# Patient Record
Sex: FEMALE | NOT HISPANIC OR LATINO | Employment: PART TIME | URBAN - METROPOLITAN AREA
[De-identification: names, ages, dates, MRNs, and addresses within clinical notes are randomized per-mention and may not be internally consistent; named-entity substitution may affect disease eponyms.]

---

## 2017-06-08 ENCOUNTER — OFFICE VISIT (OUTPATIENT)
Dept: INTERNAL MEDICINE | Facility: CLINIC | Age: 27
End: 2017-06-08
Payer: COMMERCIAL

## 2017-06-08 VITALS
WEIGHT: 118.9 LBS | DIASTOLIC BLOOD PRESSURE: 62 MMHG | OXYGEN SATURATION: 100 % | TEMPERATURE: 97.9 F | BODY MASS INDEX: 19.81 KG/M2 | SYSTOLIC BLOOD PRESSURE: 94 MMHG | HEART RATE: 68 BPM | HEIGHT: 65 IN

## 2017-06-08 DIAGNOSIS — G57.92 COMPRESSION NEUROPATHY OF LEFT LOWER EXTREMITY: Primary | ICD-10-CM

## 2017-06-08 PROCEDURE — 99213 OFFICE O/P EST LOW 20 MIN: CPT | Performed by: INTERNAL MEDICINE

## 2017-06-08 NOTE — PROGRESS NOTES
"  SUBJECTIVE:                                                    Matthias Quarlse is a 26 year old female who presents to clinic today for the following health issues:    Patient complains of tingling in left lower leg it started on Sunday and her dad had her spray some magnesium spray on it and that make it feel like it started tingling in areas that it had not before.  She sits typically with legs crossed, mostly right leg over left. Also kneels and crosses her legs a lot when sitting.    Problem list and histories reviewed & adjusted, as indicated.  Additional history: as documentedas documented    Labs reviewed in EPIC    Reviewed and updated as needed this visit by clinical staff  Allergies       Reviewed and updated as needed this visit by Provider         ROS:  Constitutional, HEENT, cardiovascular, pulmonary, gi and gu systems are negative, except as otherwise noted.    OBJECTIVE:                                                    BP 94/62  Pulse 68  Temp 97.9  F (36.6  C) (Oral)  Ht 5' 5\" (1.651 m)  Wt 118 lb 14.4 oz (53.9 kg)  LMP 05/15/2017  SpO2 100%  BMI 19.79 kg/m2  Body mass index is 19.79 kg/(m^2).  GENERAL APPEARANCE: healthy, alert and no distress  MS: extremities normal- no gross deformities noted  SKIN: no suspicious lesions or rashes  NEURO: Normal strength and tone, mentation intact and speech normal  Sensation - notes some numbness along anterior aspect of L lower leg along tibia. None in foot  Full strength on foot plantar and dorsiflexion.    Diagnostic test results:  none      ASSESSMENT/PLAN:                                                    1. Compression neuropathy of left lower extremity  -locall nerve compression such as mild peroneal neuropathy  -avoid crossing legs, kneeling, daily-cross      Follow up with Provider - joan Hurst MD  [facility]    "

## 2017-06-08 NOTE — NURSING NOTE
"Chief Complaint   Patient presents with     Musculoskeletal Problem       Initial BP 94/62  Pulse 68  Temp 97.9  F (36.6  C) (Oral)  Ht 5' 5\" (1.651 m)  Wt 118 lb 14.4 oz (53.9 kg)  LMP 05/15/2017  SpO2 100%  BMI 19.79 kg/m2 Estimated body mass index is 19.79 kg/(m^2) as calculated from the following:    Height as of this encounter: 5' 5\" (1.651 m).    Weight as of this encounter: 118 lb 14.4 oz (53.9 kg).  Medication Reconciliation: complete    "

## 2017-06-08 NOTE — MR AVS SNAPSHOT
"              After Visit Summary   6/8/2017    Matthias Quarles    MRN: [id number]           Patient Information     Date Of Birth          1990        Visit Information        Provider Department      6/8/2017 10:20 AM Paul Hurst MD [facility]        Today's Diagnoses     Compression neuropathy of left lower extremity    -  1       Follow-ups after your visit        Who to contact     If you have questions or need follow up information about today's clinic visit or your schedule please contact [facility] directly at [phone].  Normal or non-critical lab and imaging results will be communicated to you by eMithilaHaathart, letter or phone within 4 business days after the clinic has received the results. If you do not hear from us within 7 days, please contact the clinic through eMithilaHaathart or phone. If you have a critical or abnormal lab result, we will notify you by phone as soon as possible.  Submit refill requests through Yadio or call your pharmacy and they will forward the refill request to us. Please allow 3 business days for your refill to be completed.          Additional Information About Your Visit        MyChart Information     Yadio gives you secure access to your electronic health record. If you see a primary care provider, you can also send messages to your care team and make appointments. If you have questions, please call your primary care clinic.  If you do not have a primary care provider, please call [phone] and they will assist you.        Care EveryWhere ID     This is your Care EveryWhere ID. This could be used by other organizations to access your [place] medical records  [id number]        Your Vitals Were     Pulse Temperature Height Last Period Pulse Oximetry BMI (Body Mass Index)    68 97.9  F (36.6  C) (Oral) 5' 5\" (1.651 m) 05/15/2017 100% 19.79 kg/m2       Blood Pressure from Last 3 Encounters:   06/08/17 94/62   12/01/16 " 90/58   11/30/16 110/78    Weight from Last 3 Encounters:   06/08/17 118 lb 14.4 oz (53.9 kg)   12/01/16 117 lb 6.4 oz (53.3 kg)   11/30/16 116 lb 8 oz (52.8 kg)              Today, you had the following     No orders found for display       Primary Care Provider Office Phone # Fax #    Sagar Schroeder [phone]       XXX RETIRED  [address] [place] [id number]        Thank you!     Thank you for choosing [facility]  for your care. Our goal is always to provide you with excellent care. Hearing back from our patients is one way we can continue to improve our services. Please take a few minutes to complete the written survey that you may receive in the mail after your visit with us. Thank you!             Your Updated Medication List - Protect others around you: Learn how to safely use, store and throw away your medicines at www.disposemymeds.org.          This list is accurate as of: 6/8/17 10:54 AM.  Always use your most recent med list.                   Brand Name Dispense Instructions for use    norgestim-eth estrad triphasic 0.18/0.215/0.25 MG-35 MCG per tablet    TRINESSA (28)    84 tablet    Take 1 tablet by mouth daily

## 2017-06-20 ENCOUNTER — OFFICE VISIT (OUTPATIENT)
Dept: ORTHOPEDICS | Facility: CLINIC | Age: 27
End: 2017-06-20
Payer: COMMERCIAL

## 2017-06-20 VITALS
WEIGHT: 120 LBS | HEIGHT: 65 IN | DIASTOLIC BLOOD PRESSURE: 80 MMHG | SYSTOLIC BLOOD PRESSURE: 122 MMHG | BODY MASS INDEX: 19.99 KG/M2

## 2017-06-20 DIAGNOSIS — M22.2X1 PATELLOFEMORAL STRESS SYNDROME OF BOTH KNEES: Primary | ICD-10-CM

## 2017-06-20 DIAGNOSIS — M22.2X2 PATELLOFEMORAL STRESS SYNDROME OF BOTH KNEES: Primary | ICD-10-CM

## 2017-06-20 PROCEDURE — 99203 OFFICE O/P NEW LOW 30 MIN: CPT | Performed by: FAMILY MEDICINE

## 2017-06-20 NOTE — PROGRESS NOTES
"ASSESSMENT & PLAN    ICD-10-CM    1. Patellofemoral stress syndrome of both knees M22.2X1 [facility], HAND, AND CHIROPRACTIC REFERRAL    M22.2X2    Activity modification as discussed  Physical therapy: [facility] - [phone]  If you come to [place] for physical therapy would recommend [facility], Lang, Paul Breyen or [facility].    Follow up after 4-6 PT sessions if not improving or sooner if needed. Call direct clinic number [[phone]] at any time with questions or concerns.. Instructed to call the office if the condition evolves or worsens.    -----    SUBJECTIVE  Matthias Willam is a/an 26 year old female who is seen as self referral for evaluation of bilateral knee pain. The patient is seen with their father.    Onset: 10+ years(s) ago. Reports insidious onset without acute precipitating event. She feels like it started when she had a sudden growth spurt.   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    REVIEW OF SYSTEMS:  10 point ROS is negative other than symptoms noted above in HPI, Past Medical History or as stated below  Constitutional: NEGATIVE for fever, chills, change in weight  Skin: NEGATIVE for worrisome rashes, moles or lesions  GI/: NEGATIVE for bowel or bladder changes  Neuro: NEGATIVE for weakness, dizziness or paresthesias    OBJECTIVE:  /80  Ht 5' 5\" (1.651 m)  Wt 120 lb (54.4 kg)  LMP 05/15/2017  BMI 19.97 kg/m2   General: healthy, alert and in no distress  HEENT: no scleral icterus or conjunctival erythema  Skin: no suspicious lesions or " rash. No jaundice.  CV: no pedal edema  Resp: normal respiratory effort without conversational dyspnea   Psych: normal mood and affect  Gait: normal steady gait with appropriate coordination and balance  Neuro: Motor strength as noted below  MSK:  BILATERAL KNEE  Inspection:    normal alignment, increased knee valgus with one-legged squat  Palpation:    Tender about the lateral patellar facet and medial patellar facet. Remainder of bony and ligamentous landmarks are nontender.    No effusion is present    Patellofemoral crepitus is Present, excessive mobility (medial > lateral)  Range of Motion:     00 extension to 1350 flexion  Strength:    Quadriceps 5-/5    Extensor mechanism intact  Special Tests:    Positive: Patellar grind    Negative: Lachman's, posterior drawer, Valeria's    Independent visualization of the below image:  None indicated at this time    Patient's conditions were thoroughly discussed during today's visit with greater than 50% of the visit spent counseling the patient with total time spent face-to-face with the patient being 20 minutes.    Grupo Kimble DO [facility]

## 2017-06-20 NOTE — PATIENT INSTRUCTIONS
Thank you for allowing us to participate in your care today.  Please find below your visit diagnosis and the plan going forward.    1. Patellofemoral stress syndrome of both knees      Activity modification as discussed  Physical therapy: [facility] - [phone]  If you come to [place] for physical therapy would recommend [facility], [facility], [facility] or [facility].    Follow up after 4-6 PT sessions if not improving or sooner if needed. Call direct clinic number [[phone]] at any time with questions or concerns.    Grupo Kimble DO CAQSM  [facility]  Website: [email or web address]  Twitter: [email or web address]

## 2017-06-20 NOTE — MR AVS SNAPSHOT
After Visit Summary   6/20/2017    Matthias Quarles    MRN: [id number]           Patient Information     Date Of Birth          1990        Visit Information        Provider Department      6/20/2017 5:40 PM Grupo Kimble DO [facility]        Today's Diagnoses     Patellofemoral stress syndrome of both knees    -  1      Care Instructions    Thank you for allowing us to participate in your care today.  Please find below your visit diagnosis and the plan going forward.    1. Patellofemoral stress syndrome of both knees      Activity modification as discussed  Physical therapy: [facility] - [phone]  If you come to [place] for physical therapy would recommend [facility], [facility], [facility] Breyen or [facility].    Follow up after 4-6 PT sessions if not improving or sooner if needed. Call direct clinic number [[phone]] at any time with questions or concerns.    Grupo Kimble DO [facility] includes the [facility]  Website: [email or web address]  Twitter: [email or web address]            Follow-ups after your visit        Additional Services     [facility] PT, HAND, AND CHIROPRACTIC REFERRAL       **This order will print in the [facility] Scheduling Office**    Physical Therapy, Hand Therapy and Chiropractic Care are available through:    *[facility]  *[facility]  *[facility]    Call one number to schedule at any of the above locations: [phone].    Your provider has referred you to: Physical Therapy at [facility] or [facility]    Indication/Reason for Referral: b/l PFPS  Onset of Illness: chronic  Therapy Orders: Evaluate and Treat  Special Programs: None  Special Request: None    Jorge Patel      Additional Comments for the Therapist or Chiropractor: Formal physical therapy - exercises to include neuromuscular/proprioceptive control and VMO strengthening. Please also include pain-free closed  "chain/isometric/isotonic strengthening with use of modalities (including kinesioptaping) as needed/deemed appropriate with home exercise prescription.    Please be aware that coverage of these services is subject to the terms and limitations of your health insurance plan.  Call member services at your health plan with any benefit or coverage questions.      Please bring the following to your appointment:    *Your personal calendar for scheduling future appointments  *Comfortable clothing                  Who to contact     If you have questions or need follow up information about today's clinic visit or your schedule please contact [facility] directly at [phone].  Normal or non-critical lab and imaging results will be communicated to you by Domain Investhart, letter or phone within 4 business days after the clinic has received the results. If you do not hear from us within 7 days, please contact the clinic through BDAt or phone. If you have a critical or abnormal lab result, we will notify you by phone as soon as possible.  Submit refill requests through ROIÂ² or call your pharmacy and they will forward the refill request to us. Please allow 3 business days for your refill to be completed.          Additional Information About Your Visit        Domain Investhart Information     ROIÂ² gives you secure access to your electronic health record. If you see a primary care provider, you can also send messages to your care team and make appointments. If you have questions, please call your primary care clinic.  If you do not have a primary care provider, please call [phone] and they will assist you.        Care EveryWhere ID     This is your Care EveryWhere ID. This could be used by other organizations to access your [place] medical records  [id number]        Your Vitals Were     Height Last Period BMI (Body Mass Index)             5' 5\" (1.651 m) 05/15/2017 19.97 kg/m2          Blood Pressure from Last " 3 Encounters:   06/20/17 122/80   06/08/17 94/62   12/01/16 90/58    Weight from Last 3 Encounters:   06/20/17 120 lb (54.4 kg)   06/08/17 118 lb 14.4 oz (53.9 kg)   12/01/16 117 lb 6.4 oz (53.3 kg)              We Performed the Following     [facility] PT, HAND, AND CHIROPRACTIC REFERRAL        Primary Care Provider Office Phone # Fax #    Sagar Schroeder [phone]       XXX RETIRED  [address] [place] [id number]        Thank you!     Thank you for choosing [facility]  for your care. Our goal is always to provide you with excellent care. Hearing back from our patients is one way we can continue to improve our services. Please take a few minutes to complete the written survey that you may receive in the mail after your visit with us. Thank you!             Your Updated Medication List - Protect others around you: Learn how to safely use, store and throw away your medicines at www.disposemymeds.org.          This list is accurate as of: 6/20/17  6:33 PM.  Always use your most recent med list.                   Brand Name Dispense Instructions for use    norgestim-eth estrad triphasic 0.18/0.215/0.25 MG-35 MCG per tablet    TRINESSA (28)    84 tablet    Take 1 tablet by mouth daily

## 2017-08-29 ENCOUNTER — THERAPY VISIT (OUTPATIENT)
Dept: PHYSICAL THERAPY | Facility: CLINIC | Age: 27
End: 2017-08-29
Payer: COMMERCIAL

## 2017-08-29 DIAGNOSIS — M25.561 CHRONIC PAIN OF BOTH KNEES: Primary | ICD-10-CM

## 2017-08-29 DIAGNOSIS — G89.29 CHRONIC PAIN OF BOTH KNEES: Primary | ICD-10-CM

## 2017-08-29 DIAGNOSIS — M25.562 CHRONIC PAIN OF BOTH KNEES: Primary | ICD-10-CM

## 2017-08-29 PROCEDURE — 97110 THERAPEUTIC EXERCISES: CPT | Mod: GP | Performed by: PHYSICAL THERAPIST

## 2017-08-29 PROCEDURE — 97112 NEUROMUSCULAR REEDUCATION: CPT | Mod: GP | Performed by: PHYSICAL THERAPIST

## 2017-08-29 PROCEDURE — 97161 PT EVAL LOW COMPLEX 20 MIN: CPT | Mod: GP | Performed by: PHYSICAL THERAPIST

## 2017-08-29 NOTE — PROGRESS NOTES
Subjective:    Patient is a 27 year old female presenting with rehab left knee hpi. The history is provided by the patient. No  was used.   Matthias Quarles is a 27 year old female with a bilateral knees condition.  Condition occurred with:  Insidious onset.  Condition occurred: for unknown reasons.  This is a chronic condition  10+ year history of B anterior knee pain. No specific etiology of pain but thinks maybe after a growth spurt in her teens. L>R pain, progressively worse with time. Past 3 nights woke up with severe achiness. Pain is very  Variable. WORSE after flying for 3-4 hours, when menstrual cramps, stairs, kneel/squat, prolonged positions more than 5 minutes., sleeping. BETTER ice/heat. Gym 2-3 x week, running/lift weights. .    Patient reports pain:  Anterior.    Pain is described as aching and is intermittent and reported as 7/10 and 8/10.   Pain is worse during the day (occasionally worse at night).  Symptoms are exacerbated by kneeling, sitting, ascending stairs, descending stairs and bending/squatting and relieved by ice and heat.  Since onset symptoms are gradually worsening.        General health as reported by patient is good.  Pertinent medical history includes:  None.  Medical allergies: no.  Other surgeries include:  None reported.  Medication history: bc.  Current occupation is [employer] at [employer].                                    Objective:    Standing Alignment:                Ankle/Foot:  Pes planus L and pes planus R    Gait:      Deviations:  Hip:  Trendelenberg R and Trendelenberg LKnee:  Transverse plane rotation L and transverse plane rotation RGeneral Deviations:  Base of support decr                                                      Knee Evaluation:  ROM:  AROM: normal  PROM: normal  Strength wnl knee: slight extensor lag R and L with SLR, able to correct with cues. Fatigues after 5 reps.. B hip abductor strength 3+/5.           Strength:          Quad Set Left:  Fair    Pain: +   Quad Set Right:  Good    Pain: -  Ligament Testing:  Not Assessed                Special Tests:   Left knee positive for the following special tests:  Patellar Compression and Patellar Tracking-Abduction Lateral  Left knee negative for the following special tests:  Meninscal  Right knee positive for the following tests:  Patellar Compression and Patellar Tracking-Abduction Lateral  Right knee negative for the following special tests:  Meniscal  Palpation:    Left knee tenderness present at:  Patellar Medial; Patellar Lateral and Patellar Inferior    Edema:  Normal    Mobility Testing:  Normal            Functional Testing:          Quad:    Single Leg Squat:  Left:       Right:        Bilateral Leg Squat:  Pain ~ 60 degrees  Excessive anterior knee excursion and femoral IR              General     ROS    Assessment/Plan:      Patient is a 27 year old female with both sides knee complaints.    Patient has the following significant findings with corresponding treatment plan.                Diagnosis 1:  B knee pain  Pain -  hot/cold therapy, manual therapy, splint/taping/bracing/orthotics, self management, education and home program  Decreased strength - therapeutic exercise and therapeutic activities  Decreased proprioception - neuro re-education and therapeutic activities  Inflammation - cold therapy and self management/home program  Impaired gait - gait training  Impaired muscle performance - neuro re-education  Decreased function - therapeutic activities  Impaired posture - neuro re-education    Therapy Evaluation Codes:   1) History comprised of:   Personal factors that impact the plan of care:      None.    Comorbidity factors that impact the plan of care are:      None.     Medications impacting care: None.  2) Examination of Body Systems comprised of:   Body structures and functions that impact the plan of care:      Knee.   Activity limitations that impact the plan of  care are:      Sitting and Squatting/kneeling.  3) Clinical presentation characteristics are:   Stable/Uncomplicated.  4) Decision-Making    Low complexity using standardized patient assessment instrument and/or measureable assessment of functional outcome.  Cumulative Therapy Evaluation is: Low complexity.    Previous and current functional limitations:  (See Goal Flow Sheet for this information)    Short term and Long term goals: (See Goal Flow Sheet for this information)     Communication ability:  Patient appears to be able to clearly communicate and understand verbal and written communication and follow directions correctly.  Treatment Explanation - The following has been discussed with the patient:   RX ordered/plan of care  Anticipated outcomes  Possible risks and side effects  This patient would benefit from PT intervention to resume normal activities.   Rehab potential is excellent.    Frequency:  1 X week, once daily  Duration:  for 6 weeks  Discharge Plan:  Achieve all LTG.  Independent in home treatment program.  Reach maximal therapeutic benefit.    Please refer to the daily flowsheet for treatment today, total treatment time and time spent performing 1:1 timed codes.

## 2017-08-29 NOTE — MR AVS SNAPSHOT
After Visit Summary   8/29/2017    Matthias Quarles    MRN: [id number]           Patient Information     Date Of Birth          1990        Visit Information        Provider Department      8/29/2017 1:35 PM Sharonda Sanders, PT [facility]        Today's Diagnoses     Chronic pain of both knees    -  1       Follow-ups after your visit        Your next 10 appointments already scheduled     Sep 07, 2017  9:00 AM CDT   [facility] Extremity with STEVE Geiger PT ([facility]  )    [address] [address] [address]   [phone]              Who to contact     If you have questions or need follow up information about today's clinic visit or your schedule please contact [facility] directly at [phone].  Normal or non-critical lab and imaging results will be communicated to you by MyChart, letter or phone within 4 business days after the clinic has received the results. If you do not hear from us within 7 days, please contact the clinic through MyChart or phone. If you have a critical or abnormal lab result, we will notify you by phone as soon as possible.  Submit refill requests through [employer] or call your pharmacy and they will forward the refill request to us. Please allow 3 business days for your refill to be completed.          Additional Information About Your Visit        MyChart Information     [employer] gives you secure access to your electronic health record. If you see a primary care provider, you can also send messages to your care team and make appointments. If you have questions, please call your primary care clinic.  If you do not have a primary care provider, please call [phone] and they will assist you.        Care EveryWhere ID     This is your Care EveryWhere ID. This could be used by other organizations to access your [place] medical records  [id number]         Blood Pressure from Last 3 Encounters:   06/20/17  122/80   06/08/17 94/62   12/01/16 90/58    Weight from Last 3 Encounters:   06/20/17 54.4 kg (120 lb)   06/08/17 53.9 kg (118 lb 14.4 oz)   12/01/16 53.3 kg (117 lb 6.4 oz)              We Performed the Following     HC PT EVAL, LOW COMPLEXITY     [facility] INITIAL EVAL REPORT     NEUROMUSCULAR RE-EDUCATION     THERAPEUTIC EXERCISES        Primary Care Provider Office Phone # Fax #    Sagar Schroeder [phone]       XXX RETIRED  [address] [facility] [address]        Equal Access to Services     [facility] : Hadii aad ku hadasho Soomaali, waaxda luqadaha, qaybta kaalmada adeegyada, waxrita cotton haynila brenner . So [facility] [phone].    ATENCIÓN: Si habla español, tiene a rodriguez disposición servicios gratuitos de asistencia lingüística. Llame al [phone].    We comply with applicable federal civil rights laws and [place] laws. We do not discriminate on the basis of race, color, national origin, age, disability sex, sexual orientation or gender identity.            Thank you!     Thank you for choosing [facility]  for your care. Our goal is always to provide you with excellent care. Hearing back from our patients is one way we can continue to improve our services. Please take a few minutes to complete the written survey that you may receive in the mail after your visit with us. Thank you!             Your Updated Medication List - Protect others around you: Learn how to safely use, store and throw away your medicines at www.disposemymeds.org.          This list is accurate as of: 8/29/17 11:59 PM.  Always use your most recent med list.                   Brand Name Dispense Instructions for use Diagnosis    norgestim-eth estrad triphasic 0.18/0.215/0.25 MG-35 MCG per tablet    TRINESSA (28)    84 tablet    Take 1 tablet by mouth daily    Encounter for surveillance of contraceptive pills

## 2017-08-30 PROBLEM — M25.561 CHRONIC PAIN OF BOTH KNEES: Status: ACTIVE | Noted: 2017-08-30

## 2017-08-30 PROBLEM — G89.29 CHRONIC PAIN OF BOTH KNEES: Status: ACTIVE | Noted: 2017-08-30

## 2017-08-30 PROBLEM — M25.562 CHRONIC PAIN OF BOTH KNEES: Status: ACTIVE | Noted: 2017-08-30

## 2017-08-30 ASSESSMENT — ACTIVITIES OF DAILY LIVING (ADL)
KNEE_ACTIVITY_OF_DAILY_LIVING_SCORE: 68.57
WEAKNESS: I HAVE THE SYMPTOM BUT IT DOES NOT AFFECT MY ACTIVITY
SWELLING: I DO NOT HAVE THE SYMPTOM
KNEE_ACTIVITY_OF_DAILY_LIVING_SUM: 48
STIFFNESS: THE SYMPTOM AFFECTS MY ACTIVITY MODERATELY
HOW_WOULD_YOU_RATE_THE_OVERALL_FUNCTION_OF_YOUR_KNEE_DURING_YOUR_USUAL_DAILY_ACTIVITIES?: NEARLY NORMAL
GO DOWN STAIRS: ACTIVITY IS FAIRLY DIFFICULT
KNEEL ON THE FRONT OF YOUR KNEE: ACTIVITY IS FAIRLY DIFFICULT
STAND: ACTIVITY IS NOT DIFFICULT
RISE FROM A CHAIR: ACTIVITY IS MINIMALLY DIFFICULT
SIT WITH YOUR KNEE BENT: ACTIVITY IS MINIMALLY DIFFICULT
PAIN: THE SYMPTOM AFFECTS MY ACTIVITY SLIGHTLY
GO UP STAIRS: ACTIVITY IS FAIRLY DIFFICULT
LIMPING: I HAVE THE SYMPTOM BUT IT DOES NOT AFFECT MY ACTIVITY
HOW_WOULD_YOU_RATE_THE_CURRENT_FUNCTION_OF_YOUR_KNEE_DURING_YOUR_USUAL_DAILY_ACTIVITIES_ON_A_SCALE_FROM_0_TO_100_WITH_100_BEING_YOUR_LEVEL_OF_KNEE_FUNCTION_PRIOR_TO_YOUR_INJURY_AND_0_BEING_THE_INABILITY_TO_PERFORM_ANY_OF_YOUR_USUAL_DAILY_ACTIVITIES?: 90
SQUAT: ACTIVITY IS VERY DIFFICULT
GIVING WAY, BUCKLING OR SHIFTING OF KNEE: I DO NOT HAVE THE SYMPTOM
WALK: ACTIVITY IS NOT DIFFICULT
AS_A_RESULT_OF_YOUR_KNEE_INJURY,_HOW_WOULD_YOU_RATE_YOUR_CURRENT_LEVEL_OF_DAILY_ACTIVITY?: NEARLY NORMAL
RAW_SCORE: 48

## 2017-08-31 ENCOUNTER — OFFICE VISIT (OUTPATIENT)
Dept: INTERNAL MEDICINE | Facility: CLINIC | Age: 27
End: 2017-08-31
Payer: COMMERCIAL

## 2017-08-31 VITALS
HEART RATE: 71 BPM | SYSTOLIC BLOOD PRESSURE: 124 MMHG | TEMPERATURE: 98.3 F | HEIGHT: 65 IN | BODY MASS INDEX: 19.73 KG/M2 | WEIGHT: 118.4 LBS | DIASTOLIC BLOOD PRESSURE: 84 MMHG | OXYGEN SATURATION: 100 %

## 2017-08-31 DIAGNOSIS — N93.9 VAGINAL BLEEDING: Primary | ICD-10-CM

## 2017-08-31 LAB
ALBUMIN UR-MCNC: NEGATIVE MG/DL
APPEARANCE UR: CLEAR
BETA HCG QUAL IFA URINE: NEGATIVE
BILIRUB UR QL STRIP: NEGATIVE
COLOR UR AUTO: YELLOW
GLUCOSE UR STRIP-MCNC: NEGATIVE MG/DL
HGB UR QL STRIP: NEGATIVE
KETONES UR STRIP-MCNC: NEGATIVE MG/DL
LEUKOCYTE ESTERASE UR QL STRIP: NEGATIVE
NITRATE UR QL: NEGATIVE
PH UR STRIP: 6 PH (ref 5–7)
RBC #/AREA URNS AUTO: NORMAL /HPF
SOURCE: NORMAL
SP GR UR STRIP: 1.02 (ref 1–1.03)
SPECIMEN SOURCE: NORMAL
UROBILINOGEN UR STRIP-ACNC: 0.2 EU/DL (ref 0.2–1)
WBC #/AREA URNS AUTO: NORMAL /HPF
WET PREP SPEC: NORMAL

## 2017-08-31 PROCEDURE — 81001 URINALYSIS AUTO W/SCOPE: CPT | Performed by: PHYSICIAN ASSISTANT

## 2017-08-31 PROCEDURE — 87491 CHLMYD TRACH DNA AMP PROBE: CPT | Performed by: PHYSICIAN ASSISTANT

## 2017-08-31 PROCEDURE — 87210 SMEAR WET MOUNT SALINE/INK: CPT | Performed by: PHYSICIAN ASSISTANT

## 2017-08-31 PROCEDURE — 87591 N.GONORRHOEAE DNA AMP PROB: CPT | Performed by: PHYSICIAN ASSISTANT

## 2017-08-31 PROCEDURE — 99214 OFFICE O/P EST MOD 30 MIN: CPT | Performed by: PHYSICIAN ASSISTANT

## 2017-08-31 PROCEDURE — 84703 CHORIONIC GONADOTROPIN ASSAY: CPT | Performed by: PHYSICIAN ASSISTANT

## 2017-08-31 NOTE — MR AVS SNAPSHOT
After Visit Summary   8/31/2017    Matthias Quarles    MRN: [id number]           Patient Information     Date Of Birth          1990        Visit Information        Provider Department      8/31/2017 1:20 PM Yamel Kay PA-C [facility]        Today's Diagnoses     Vaginal bleeding    -  1       Follow-ups after your visit        Your next 10 appointments already scheduled     Sep 07, 2017  9:00 AM CDT   ISMAEL Extremity with Bina Delgado PTA   [facility] [place]  [facility]    [address]  [address]  [facility] [address]   [phone]              Who to contact     If you have questions or need follow up information about today's clinic visit or your schedule please contact [facility] directly at [phone].  Normal or non-critical lab and imaging results will be communicated to you by MyChart, letter or phone within 4 business days after the clinic has received the results. If you do not hear from us within 7 days, please contact the clinic through MyChart or phone. If you have a critical or abnormal lab result, we will notify you by phone as soon as possible.  Submit refill requests through "[employer] or call your pharmacy and they will forward the refill request to us. Please allow 3 business days for your refill to be completed.          Additional Information About Your Visit        MyChart Information     "[employer] gives you secure access to your electronic health record. If you see a primary care provider, you can also send messages to your care team and make appointments. If you have questions, please call your primary care clinic.  If you do not have a primary care provider, please call [phone] and they will assist you.        Care EveryWhere ID     This is your Care EveryWhere ID. This could be used by other organizations to access your [place] medical records  [id number]        Your Vitals Were     Pulse  "Temperature Height Last Period Pulse Oximetry BMI (Body Mass Index)    71 98.3  F (36.8  C) (Oral) 5' 5\" (1.651 m) 08/16/2017 100% 19.7 kg/m2       Blood Pressure from Last 3 Encounters:   08/31/17 124/84   06/20/17 122/80   06/08/17 94/62    Weight from Last 3 Encounters:   08/31/17 118 lb 6.4 oz (53.7 kg)   06/20/17 120 lb (54.4 kg)   06/08/17 118 lb 14.4 oz (53.9 kg)              We Performed the Following     Beta HCG qual IFA urine     Chlamydia trachomatis PCR     Neisseria gonorrhoeae PCR     UA with Microscopic reflex to Culture     Wet prep        Primary Care Provider Office Phone # Fax #    Sagar Schroeder [phone]       XXX RETIRED  [address] [facility] [address]        Equal Access to Services     MOOKIE HARDIN : Hadii sarbjit ku hadasho Soomaali, waaxda luqadaha, qaybta kaalmada adeegyada, cynthia brenner . So [facility] [phone].    ATENCIÓN: Si monico gaxiola, tiene a rodriguez disposición servicios gratuitos de asistencia lingüística. Llame al [phone].    We comply with applicable federal civil rights laws and [place] laws. We do not discriminate on the basis of race, color, national origin, age, disability sex, sexual orientation or gender identity.            Thank you!     Thank you for choosing [facility]  for your care. Our goal is always to provide you with excellent care. Hearing back from our patients is one way we can continue to improve our services. Please take a few minutes to complete the written survey that you may receive in the mail after your visit with us. Thank you!             Your Updated Medication List - Protect others around you: Learn how to safely use, store and throw away your medicines at www.disposemymeds.org.          This list is accurate as of: 8/31/17  2:37 PM.  Always use your most recent med list.                   Brand Name Dispense Instructions for use Diagnosis    norgestim-eth estrad " triphasic 0.18/0.215/0.25 MG-35 MCG per tablet    TRINESSA (28)    84 tablet    Take 1 tablet by mouth daily    Encounter for surveillance of contraceptive pills

## 2017-08-31 NOTE — PROGRESS NOTES
"  SUBJECTIVE:   Matthias Quarles is a 27 year old female who presents to clinic today for the following health issues:      Vaginal Bleeding (Dysmenorrhea)  Onset: x1 wk     Description:   Duration of bleeding episodes: off and on x1 wk   Frequency between periods:  28 days   Describe bleeding/flow:   Clots: YES  Number of pads/hour: just used pads one day-  Cramping: none     Accompanying Signs & Symptoms:  Weakness: no   Lightheadedness: no   Hot flashes: no   Nosebleeds/Easy bruising: no   Vaginal Discharge: no     History:  Patient's last menstrual period was 08/16/2017.  Previous normal periods: YES  Contraceptive use: YES and oral contraceptive - no skipped or missed pills  Possibility of Pregnancy: Dont know but would like a test   Any bleeding after intercourse: YES- last night   Age of first period (menarche): 19  Abnormal PAP Smears: no     Precipitating factors:   Na     Alleviating factors:  Na     Therapies Tried and outcome: nothing       -------------------------------------    Problem list and histories reviewed & adjusted, as indicated.  Additional history: as documented    Labs reviewed in EPIC    Reviewed and updated as needed this visit by clinical staffTobacco  Allergies       Reviewed and updated as needed this visit by Provider  Allergies  Meds         ROS:  Constitutional, , cardiovascular, pulmonary, gi and gu systems are negative, except as otherwise noted.      OBJECTIVE:   /84 (BP Location: Left arm, Patient Position: Chair, Cuff Size: Adult Regular)  Pulse 71  Temp 98.3  F (36.8  C) (Oral)  Ht 5' 5\" (1.651 m)  Wt 118 lb 6.4 oz (53.7 kg)  LMP 08/16/2017  SpO2 100%  BMI 19.7 kg/m2  Body mass index is 19.7 kg/(m^2).  GENERAL: healthy, alert and no distress  RESP: lungs clear to auscultation - no rales, rhonchi or wheezes  CV: regular rate and rhythm, normal S1 S2, no S3 or S4, no murmur, click or rub, no peripheral edema and peripheral pulses strong  ABDOMEN: soft, nontender, " no hepatosplenomegaly, no masses and bowel sounds normal   (female): normal female external genitalia, normal urethral meatus , vaginal mucosa pink, moist, well rugated, vaginal discharge - moderate and brown and normal cervix, adnexae, and uterus without masses.  MS: no gross musculoskeletal defects noted, no edema    Diagnostic Test Results:  Results for orders placed or performed in visit on 08/31/17 (from the past 24 hour(s))   UA with Microscopic reflex to Culture   Result Value Ref Range    Color Urine Yellow     Appearance Urine Clear     Glucose Urine Negative NEG^Negative mg/dL    Bilirubin Urine Negative NEG^Negative    Ketones Urine Negative NEG^Negative mg/dL    Specific Gravity Urine 1.025 1.003 - 1.035    pH Urine 6.0 5.0 - 7.0 pH    Protein Albumin Urine Negative NEG^Negative mg/dL    Urobilinogen Urine 0.2 0.2 - 1.0 EU/dL    Nitrite Urine Negative NEG^Negative    Blood Urine Negative NEG^Negative    Leukocyte Esterase Urine Negative NEG^Negative    Source Midstream Urine     WBC Urine O - 2 OTO2^O - 2 /HPF    RBC Urine O - 2 OTO2^O - 2 /HPF   Beta HCG qual IFA urine   Result Value Ref Range    Beta HCG Qual IFA Urine Negative NEG^Negative      Wet prep   Result Value Ref Range    Specimen Description Vagina     Wet Prep No Trichomonas seen     Wet Prep No yeast seen     Wet Prep No clue cells seen        ASSESSMENT/PLAN:             1. Vaginal bleeding    - UA with Microscopic reflex to Culture  - Beta HCG qual IFA urine  - Wet prep  - Neisseria gonorrhoeae PCR  - Chlamydia trachomatis PCR    Reassurance given. Intermenstrual bleeding - on OCP   If continues- recheck in clinic with gyn     25 minutes spent with patient > 50% of time on counseling and plan of care.    Yamel Kay PA-C  [facility]

## 2017-08-31 NOTE — NURSING NOTE
"Chief Complaint   Patient presents with     Vaginal Problem     blood clots-x1 wk        Initial /84 (BP Location: Left arm, Patient Position: Chair, Cuff Size: Adult Regular)  Pulse 71  Temp 98.3  F (36.8  C) (Oral)  Ht 5' 5\" (1.651 m)  Wt 118 lb 6.4 oz (53.7 kg)  LMP 08/16/2017  SpO2 100%  BMI 19.7 kg/m2 Estimated body mass index is 19.7 kg/(m^2) as calculated from the following:    Height as of this encounter: 5' 5\" (1.651 m).    Weight as of this encounter: 118 lb 6.4 oz (53.7 kg).  Medication Reconciliation: complete    "

## 2017-09-01 LAB
C TRACH DNA SPEC QL NAA+PROBE: NEGATIVE
N GONORRHOEA DNA SPEC QL NAA+PROBE: NEGATIVE
SPECIMEN SOURCE: NORMAL
SPECIMEN SOURCE: NORMAL

## 2017-09-20 PROBLEM — M25.562 CHRONIC PAIN OF BOTH KNEES: Status: RESOLVED | Noted: 2017-08-30 | Resolved: 2017-09-20

## 2017-09-20 PROBLEM — G89.29 CHRONIC PAIN OF BOTH KNEES: Status: RESOLVED | Noted: 2017-08-30 | Resolved: 2017-09-20

## 2017-09-20 PROBLEM — M25.561 CHRONIC PAIN OF BOTH KNEES: Status: RESOLVED | Noted: 2017-08-30 | Resolved: 2017-09-20

## 2017-11-08 DIAGNOSIS — Z30.41 ENCOUNTER FOR SURVEILLANCE OF CONTRACEPTIVE PILLS: ICD-10-CM

## 2017-11-08 RX ORDER — NORGESTIMATE AND ETHINYL ESTRADIOL 7DAYSX3 28
1 KIT ORAL DAILY
Qty: 84 TABLET | Refills: 0 | Status: SHIPPED | OUTPATIENT
Start: 2017-11-08 | End: 2018-01-02

## 2017-11-08 NOTE — TELEPHONE ENCOUNTER
norgestim-eth trinessa      Last Written Prescription Date: 11/30/16  Last Fill Quantity: 84,  # refills: 3   Last Office Visit with G, P or [facility] prescribing provider: 08/31/17

## 2017-12-17 ENCOUNTER — HEALTH MAINTENANCE LETTER (OUTPATIENT)
Age: 27
End: 2017-12-17

## 2018-01-02 DIAGNOSIS — Z30.41 ENCOUNTER FOR SURVEILLANCE OF CONTRACEPTIVE PILLS: ICD-10-CM

## 2018-01-02 RX ORDER — NORGESTIMATE AND ETHINYL ESTRADIOL 7DAYSX3 28
KIT ORAL
Qty: 30 TABLET | Refills: 0 | Status: SHIPPED | OUTPATIENT
Start: 2018-01-02 | End: 2018-01-23

## 2018-01-02 NOTE — LETTER
[facility]  [address] [address]  [phone]            Matthias Willam  [address]  [place] [facility] [address]        January 2, 2018    Dear Matthias,    While refilling your prescription today, we noticed that you are due for an appointment with your provider.  We will refill your prescription for 30 days, but a follow-up appointment must be made before any additional refills can be approved.     Taking care of your health is important to us and we look forward to seeing you in the near future.  Please call us at [phone] or [phone] (or use [employer]) to schedule an appointment.     Please disregard this notice if you have already made an appointment.    Sincerely,        [facility]

## 2018-01-23 ENCOUNTER — OFFICE VISIT (OUTPATIENT)
Dept: OBGYN | Facility: CLINIC | Age: 28
End: 2018-01-23
Payer: COMMERCIAL

## 2018-01-23 VITALS
HEIGHT: 65 IN | BODY MASS INDEX: 20.09 KG/M2 | DIASTOLIC BLOOD PRESSURE: 62 MMHG | SYSTOLIC BLOOD PRESSURE: 110 MMHG | WEIGHT: 120.6 LBS

## 2018-01-23 DIAGNOSIS — R87.619 ABNORMAL PAP SMEAR OF CERVIX: ICD-10-CM

## 2018-01-23 DIAGNOSIS — Z11.3 SCREEN FOR STD (SEXUALLY TRANSMITTED DISEASE): ICD-10-CM

## 2018-01-23 DIAGNOSIS — Z12.4 SCREENING FOR CERVICAL CANCER: ICD-10-CM

## 2018-01-23 DIAGNOSIS — Z30.41 ENCOUNTER FOR SURVEILLANCE OF CONTRACEPTIVE PILLS: ICD-10-CM

## 2018-01-23 DIAGNOSIS — Z11.51 SCREENING FOR HUMAN PAPILLOMAVIRUS: ICD-10-CM

## 2018-01-23 DIAGNOSIS — Z01.419 WOMEN'S ANNUAL ROUTINE GYNECOLOGICAL EXAMINATION: Primary | ICD-10-CM

## 2018-01-23 PROCEDURE — 99395 PREV VISIT EST AGE 18-39: CPT | Performed by: ADVANCED PRACTICE MIDWIFE

## 2018-01-23 PROCEDURE — 87491 CHLMYD TRACH DNA AMP PROBE: CPT | Performed by: ADVANCED PRACTICE MIDWIFE

## 2018-01-23 PROCEDURE — 87624 HPV HI-RISK TYP POOLED RSLT: CPT | Performed by: ADVANCED PRACTICE MIDWIFE

## 2018-01-23 PROCEDURE — G0145 SCR C/V CYTO,THINLAYER,RESCR: HCPCS | Performed by: ADVANCED PRACTICE MIDWIFE

## 2018-01-23 PROCEDURE — 87591 N.GONORRHOEAE DNA AMP PROB: CPT | Performed by: ADVANCED PRACTICE MIDWIFE

## 2018-01-23 PROCEDURE — G0124 SCREEN C/V THIN LAYER BY MD: HCPCS | Performed by: ADVANCED PRACTICE MIDWIFE

## 2018-01-23 RX ORDER — NORGESTIMATE AND ETHINYL ESTRADIOL 7DAYSX3 28
1 KIT ORAL DAILY
Qty: 30 TABLET | Refills: 11 | Status: SHIPPED | OUTPATIENT
Start: 2018-01-23 | End: 2019-01-08

## 2018-01-23 NOTE — PROGRESS NOTES
Matthias is a 27 year old  female who presents for annual exam.     Besides routine health maintenance, she has no other health concerns today .  HPI:  Matthias presents for annual exam. Denies issues or concerns. She reports an increase in intermittent brownish discharge. She has been on her OCP since she was 17 years old. Reports perfect pill use. Denies other s/s of vaginitis.   Menses are regular q 28-30 days and normal lasting 5 days.   Menses flow: normal.    Patient's last menstrual period was 2018..   Using oral contraceptives for contraception.    She is not currently considering pregnancy.    REPRODUCTIVE/GYNECOLOGIC HISTORY:  Matthias is sexually active with male partner(s) and is currently in monogamous relationship.   STI testing offered?  Accepted  History of abnormal Pap smear:  No  SOCIAL HISTORY  Abuse: does not report having previously been physical or sexually abused.    Do you feel safe in your environment? YES    She  reports that she has never smoked. She has never used smokeless tobacco.        Last PHQ-9 score on record = PHQ-9 SCORE 9/15/2014   Total Score 9     Last GAD7 score on record =   ANTONIETA-7 SCORE 9/15/2014   Total Score 2     Alcohol Score = social    HEALTH MAINTENANCE:  Cholesterol: (  Cholesterol   Date Value Ref Range Status   2012 188 0 - 200 mg/dL Final     Comment:     LDL Cholesterol is the primary guide to therapy.   The NCEP recommends further evaluation of: patients with cholesterol greater   than 200 mg/dL if additional risk factors are present, cholesterol greater   than   240 mg/dL, triglycerides greater than 150 mg/dL, or HDL less than 40 mg/dL.    History of abnormal lipids: No  Mammo: not indicated . History of abnormal Mammo: Not applicable.  Regular Self Breast Exams: No, educated on method and importance.   TSH: (  TSH   Date Value Ref Range Status   2012 0.97 0.4 - 5.0 mU/L Final    )  Pap; (  Lab Results   Component Value Date    PAP NIL 10/06/2014     )  Immunizations up to date: yes  (Gardasil, Tdap, Flu)  Health maintenance updated:  yes    HEALTHY HABITS  Eating habits: eats regular meals  Calcium/Vitamin D intake: source:  dairy Adequate?   Exercise: How often do you exercise? Daily;Walking  Have you had an eye exam in the last two years? YES  Do you routinely see the dentist once or twice yearly? YES      HISTORY:  Obstetric History       T0      L0     SAB0   TAB0   Ectopic0   Multiple0   Live Births0         Past Medical History:   Diagnosis Date     Chronic leukopenia     benign; has been evaluated by heme in past (2012/); no intervention indicated; regular monitoring NOT indicated (only check as clinically indicated)     Past Surgical History:   Procedure Laterality Date     NO HISTORY OF SURGERY       Family History   Problem Relation Age of Onset     HEART DISEASE Maternal Grandfather      details unknown     Type 2 Diabetes Paternal Grandfather      Myocardial Infarction No family hx of      CEREBROVASCULAR DISEASE Paternal Grandfather      later in life     Coronary Artery Disease Early Onset No family hx of      CANCER No family hx of      no colon, breast, or ovarian     Social History     Social History     Marital status: other     Spouse name: N/A     Number of children: N/A     Years of education: N/A     Occupational History     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     No kids.    [place] - in [place] ([place] of ).    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        Current Outpatient Prescriptions:      norgestim-eth estrad triphasic (TRI-SPRINTEC) 0.18/0.215/0.25 MG-35  "MCG per tablet, Take 1 tablet by mouth daily, Disp: 30 tablet, Rfl: 11     [DISCONTINUED] TRI-SPRINTEC 0.18/0.215/0.25 MG-35 MCG per tablet, TAKE ONE TABLET BY MOUTH ONCE DAILY, Disp: 30 tablet, Rfl: 0   No Known Allergies    Past medical, surgical, social and family history were reviewed and updated in EPIC.    ROS:   C: NEGATIVE for fever, chills, change in weight  I: NEGATIVE for worrisome rashes, moles or lesions  E: NEGATIVE for vision changes or irritation  ENT: NEGATIVE for ear, mouth and throat problems  R: NEGATIVE for significant cough or SOB  B: NEGATIVE for masses, tenderness or discharge  CV: NEGATIVE for chest pain, palpitations or peripheral edema  GI: NEGATIVE for nausea, abdominal pain, heartburn, or change in bowel habits  : NEGATIVE for unusual urinary or vaginal symptoms. Periods are regular. Positive for intermittent, brown discharge.  M: NEGATIVE for significant arthralgias or myalgia  N: NEGATIVE for weakness, dizziness or paresthesias  P: NEGATIVE for changes in mood or affect    PHYSICAL EXAM:  /62 (BP Location: Right arm, Patient Position: Chair, Cuff Size: Adult Regular)  Ht 1.651 m (5' 5\")  Wt 54.7 kg (120 lb 9.6 oz)  LMP 01/01/2018  BMI 20.07 kg/m2   BMI: Body mass index is 20.07 kg/(m^2).  Constitutional: healthy, alert and no distress  Neck: symmetrical, thyroid normal size, no masses present, no lymphadenopathy present.   Cardiovascular: RRR, no murmurs present  Respiratory: breathing unlabored, lungs CTA bilaterally  Breast:normal without masses, tenderness or nipple discharge and no palpable axillary masses or adenopathy  Gastrointestinal: abdomen soft, non-tender, bowel sounds present  PELVIC EXAM:  Vulva: No lesions, no adenopathy, BUS WNL  Vagina: Moist, pink, discharge normal  well rugated, no lesions, appears to be part of a tampon inside vagina, removed easily.  Cervix:smooth, pink, no visible lesions  Uterus: Normal size, non-tender, mobile  Ovaries: No masses " palpated  Rectal exam: deferred    ASSESSMENT/PLAN:    ICD-10-CM    1. Women's annual routine gynecological examination Z01.419    2. Screening for cervical cancer Z12.4 PAP imaged thin layer screen   3. Screen for STD (sexually transmitted disease) Z11.3 NEISSERIA GONORRHOEA PCR     CHLAMYDIA TRACHOMATIS PCR   4. Encounter for surveillance of contraceptive pills Z30.41 norgestim-eth estrad triphasic (TRI-SPRINTEC) 0.18/0.215/0.25 MG-35 MCG per tablet     Results for orders placed or performed in visit on 08/31/17   UA with Microscopic reflex to Culture   Result Value Ref Range    Color Urine Yellow     Appearance Urine Clear     Glucose Urine Negative NEG^Negative mg/dL    Bilirubin Urine Negative NEG^Negative    Ketones Urine Negative NEG^Negative mg/dL    Specific Gravity Urine 1.025 1.003 - 1.035    pH Urine 6.0 5.0 - 7.0 pH    Protein Albumin Urine Negative NEG^Negative mg/dL    Urobilinogen Urine 0.2 0.2 - 1.0 EU/dL    Nitrite Urine Negative NEG^Negative    Blood Urine Negative NEG^Negative    Leukocyte Esterase Urine Negative NEG^Negative    Source Midstream Urine     WBC Urine O - 2 OTO2^O - 2 /HPF    RBC Urine O - 2 OTO2^O - 2 /HPF   Beta HCG qual IFA urine   Result Value Ref Range    Beta HCG Qual IFA Urine Negative NEG^Negative      Wet prep   Result Value Ref Range    Specimen Description Vagina     Wet Prep No Trichomonas seen     Wet Prep No yeast seen     Wet Prep No clue cells seen    Neisseria gonorrhoeae PCR   Result Value Ref Range    Specimen Descrip Cervix     N Gonorrhea PCR Negative NEG^Negative   Chlamydia trachomatis PCR   Result Value Ref Range    Specimen Description Cervix     Chlamydia Trachomatis PCR Negative NEG^Negative         COUNSELING:   Reviewed preventive health counseling, as reflected in patient instructions   reports that she has never smoked. She has never used smokeless tobacco.    Pap and cultures today. Rx for refill of OCP x1 year. Discussed that she had a piece of  tampon stuck in vaginal vault and it was removed. Discharge should go away. Patient verbalizes understanding and agrees with plan.     Jackie Torrez CNM

## 2018-01-23 NOTE — MR AVS SNAPSHOT
After Visit Summary   1/23/2018    Matthias Quarles    MRN: [id number]           Patient Information     Date Of Birth          1990        Visit Information        Provider Department      1/23/2018 10:15 AM Jackie Torrez APRN CNM [facility]        Today's Diagnoses     Women's annual routine gynecological examination    -  1    Screening for cervical cancer        Screen for STD (sexually transmitted disease)        Encounter for surveillance of contraceptive pills           Follow-ups after your visit        Follow-up notes from your care team     Return in about 1 year (around 1/23/2019) for Routine Visit.      Who to contact     If you have questions or need follow up information about today's clinic visit or your schedule please contact [facility] directly at [phone].  Normal or non-critical lab and imaging results will be communicated to you by MyChart, letter or phone within 4 business days after the clinic has received the results. If you do not hear from us within 7 days, please contact the clinic through MyChart or phone. If you have a critical or abnormal lab result, we will notify you by phone as soon as possible.  Submit refill requests through [employer] or call your pharmacy and they will forward the refill request to us. Please allow 3 business days for your refill to be completed.          Additional Information About Your Visit        MyChart Information     [employer] gives you secure access to your electronic health record. If you see a primary care provider, you can also send messages to your care team and make appointments. If you have questions, please call your primary care clinic.  If you do not have a primary care provider, please call [phone] and they will assist you.        Care EveryWhere ID     This is your Care EveryWhere ID. This could be used by other organizations to access your [facility]  "records  [id number]        Your Vitals Were     Height Last Period BMI (Body Mass Index)             1.651 m (5' 5\") 01/01/2018 20.07 kg/m2          Blood Pressure from Last 3 Encounters:   01/23/18 110/62   08/31/17 124/84   06/20/17 122/80    Weight from Last 3 Encounters:   01/23/18 54.7 kg (120 lb 9.6 oz)   08/31/17 53.7 kg (118 lb 6.4 oz)   06/20/17 54.4 kg (120 lb)              We Performed the Following     CHLAMYDIA TRACHOMATIS PCR     NEISSERIA GONORRHOEA PCR     PAP imaged thin layer screen          Today's Medication Changes          These changes are accurate as of: 1/23/18 11:13 AM.  If you have any questions, ask your nurse or doctor.               These medicines have changed or have updated prescriptions.        Dose/Directions    norgestim-eth estrad triphasic 0.18/0.215/0.25 MG-35 MCG per tablet   Commonly known as:  TRI-SPRINTEC   This may have changed:  See the new instructions.   Used for:  Encounter for surveillance of contraceptive pills   Changed by:  Jackie Torrez APRN CNM        Dose:  1 tablet   Take 1 tablet by mouth daily   Quantity:  30 tablet   Refills:  11            Where to get your medicines      These medications were sent to [facility] [address] [place] [id number]     Phone:  [phone]     norgestim-eth estrad triphasic 0.18/0.215/0.25 MG-35 MCG per tablet                Primary Care Provider Office Phone # Fax #    [facility] [place] [phone] [address] [address]        Equal Access to Services     MANDI HARDIN AH: Hadtomas Phan, wadanielda luqadaha, qaybta kaalmada arnaud, cynthia avila. So [facility] [phone].    ATENCIÓN: Si habla español, tiene a rodriguez disposición servicios gratuitos de asistencia lingüística. Llame al [phone].    We comply with applicable federal civil rights laws and [place] " laws. We do not discriminate on the basis of race, color, national origin, age, disability, sex, sexual orientation, or gender identity.            Thank you!     Thank you for choosing [facility]  for your care. Our goal is always to provide you with excellent care. Hearing back from our patients is one way we can continue to improve our services. Please take a few minutes to complete the written survey that you may receive in the mail after your visit with us. Thank you!             Your Updated Medication List - Protect others around you: Learn how to safely use, store and throw away your medicines at www.disposemymeds.org.          This list is accurate as of: 1/23/18 11:13 AM.  Always use your most recent med list.                   Brand Name Dispense Instructions for use Diagnosis    norgestim-eth estrad triphasic 0.18/0.215/0.25 MG-35 MCG per tablet    TRI-SPRINTEC    30 tablet    Take 1 tablet by mouth daily    Encounter for surveillance of contraceptive pills

## 2018-01-23 NOTE — NURSING NOTE
"Chief Complaint   Patient presents with     Gyn Exam     pap is due       Initial /62 (BP Location: Right arm, Patient Position: Chair, Cuff Size: Adult Regular)  Ht 1.651 m (5' 5\")  Wt 54.7 kg (120 lb 9.6 oz)  LMP 2018  BMI 20.07 kg/m2 Estimated body mass index is 20.07 kg/(m^2) as calculated from the following:    Height as of this encounter: 1.651 m (5' 5\").    Weight as of this encounter: 54.7 kg (120 lb 9.6 oz).  BP completed using cuff size: regular        The following HM Due: pap smear      The following patient reported/Care Every where data was sent to:  P ABSTRACT QUALITY INITIATIVES [[id number]]        patient has appointment for today              "

## 2018-01-26 LAB
COPATH REPORT: ABNORMAL
PAP: ABNORMAL

## 2018-02-01 ENCOUNTER — RESULT FOLLOW UP (OUTPATIENT)
Dept: OBGYN | Facility: CLINIC | Age: 28
End: 2018-02-01

## 2018-02-01 DIAGNOSIS — R87.810 ASCUS WITH POSITIVE HIGH RISK HPV CERVICAL: ICD-10-CM

## 2018-02-01 DIAGNOSIS — R87.610 ASCUS WITH POSITIVE HIGH RISK HPV CERVICAL: ICD-10-CM

## 2018-02-01 LAB
FINAL DIAGNOSIS: ABNORMAL
HPV HR 12 DNA CVX QL NAA+PROBE: POSITIVE
HPV16 DNA SPEC QL NAA+PROBE: NEGATIVE
HPV18 DNA SPEC QL NAA+PROBE: NEGATIVE
SPECIMEN DESCRIPTION: ABNORMAL
SPECIMEN SOURCE CVX/VAG CYTO: ABNORMAL

## 2018-02-01 NOTE — LETTER
March 25, 2018      Matthias Nowakfaye  [address] [place] [id number]    Dear ,      At [facility], your health and wellness is our primary concern. 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 Your provider had recommended that you have a Colposcopy completed by 4/23/18. Our records do not show that this has been scheduled.    It is important to complete the follow up that your provider has suggested for you to ensure that there are no worsening changes which may, over time, develop into cancer.      Please contact our office at  [phone] to schedule an appointment for a Colposcopy at your earliest convenience. If you have questions or concerns, please call the clinic and we will be happy to assist you.    If you have completed the tests outside of [facility], please have the results forwarded to our office. We will update the chart for your primary Physician to review before your next annual physical.     Thank you for choosing [facility]!    Sincerely,      ARIANNE Peng CNM/james

## 2018-02-01 NOTE — PROGRESS NOTES
1/23/18 ASCUS, +HR HPV, not 16/18. Plan colp   2/2/18 Left message for patient to call back  2/5/18 Patient notified of results and recommendations.  3/25/18 [facility] reminder letter sent (rl)  5/1/18 [facility] ECC: neg. Plan 1 year cotest (ag) 5/17/18 Care team left message for pt to call back for results ([facility])  1/29/2019 Pap: NIL, Neg HPV.  Plan: cotest in 3 years

## 2018-05-01 ENCOUNTER — OFFICE VISIT (OUTPATIENT)
Dept: OBGYN | Facility: CLINIC | Age: 28
End: 2018-05-01
Payer: COMMERCIAL

## 2018-05-01 VITALS — DIASTOLIC BLOOD PRESSURE: 68 MMHG | BODY MASS INDEX: 19.44 KG/M2 | WEIGHT: 116.8 LBS | SYSTOLIC BLOOD PRESSURE: 100 MMHG

## 2018-05-01 DIAGNOSIS — R87.610 ASCUS WITH POSITIVE HIGH RISK HPV CERVICAL: ICD-10-CM

## 2018-05-01 DIAGNOSIS — R87.810 ASCUS WITH POSITIVE HIGH RISK HPV CERVICAL: ICD-10-CM

## 2018-05-01 DIAGNOSIS — B97.7 HPV IN FEMALE: Primary | ICD-10-CM

## 2018-05-01 PROCEDURE — 88305 TISSUE EXAM BY PATHOLOGIST: CPT | Performed by: OBSTETRICS & GYNECOLOGY

## 2018-05-01 PROCEDURE — 57456 ENDOCERV CURETTAGE W/SCOPE: CPT | Performed by: OBSTETRICS & GYNECOLOGY

## 2018-05-01 NOTE — PROGRESS NOTES
SUBJECTIVE:: Matthias Quarles, is a 27 year old female, who had a recent ASCUS pap.  HPV positive non 16 & 18.  No prior history of abnormal pap.Here today for colposcopy. Discussed indication, risks of infection and bleeding.    Procedure: Cervix is stained with acetic acid and veiwed colposcopically. Squamocolumner junction is  visualized in it's entirity. no visible lesions, no mosaicism, no punctation and no abnormal vasculature . Biopsy done No. Endocervical curretage Done    Impression: colposcopy adequate and Normal cervix  Plan: Await the results of the biopsies.

## 2018-05-01 NOTE — MR AVS SNAPSHOT
After Visit Summary   5/1/2018    Matthias Quarles    MRN: [id number]           Patient Information     Date Of Birth          1990        Visit Information        Provider Department      5/1/2018 1:30 PM Sacha Lyons MD; [facility] PROCEDURE [facility]        Today's Diagnoses     HPV in female    -  1    ASCUS with positive high risk HPV cervical           Follow-ups after your visit        Who to contact     If you have questions or need follow up information about today's clinic visit or your schedule please contact [facility] directly at [phone].  Normal or non-critical lab and imaging results will be communicated to you by MyChart, letter or phone within 4 business days after the clinic has received the results. If you do not hear from us within 7 days, please contact the clinic through [facility] or phone. If you have a critical or abnormal lab result, we will notify you by phone as soon as possible.  Submit refill requests through [facility] or call your pharmacy and they will forward the refill request to us. Please allow 3 business days for your refill to be completed.          Additional Information About Your Visit        MyChart Information     [facility] gives you secure access to your electronic health record. If you see a primary care provider, you can also send messages to your care team and make appointments. If you have questions, please call your primary care clinic.  If you do not have a primary care provider, please call [phone] and they will assist you.        Care EveryWhere ID     This is your Care EveryWhere ID. This could be used by other organizations to access your [facility] medical records  [id number]        Your Vitals Were     Last Period BMI (Body Mass Index)                04/27/2018 19.44 kg/m2           Blood Pressure from Last 3 Encounters:   05/01/18 100/68   01/23/18 110/62   08/31/17 124/84    Weight  from Last 3 Encounters:   05/01/18 116 lb 12.8 oz (53 kg)   01/23/18 120 lb 9.6 oz (54.7 kg)   08/31/17 118 lb 6.4 oz (53.7 kg)              We Performed the Following     COLP CERVIX/UPPER VAGINA W ENDOCERV CURETT     Surgical pathology exam        Primary Care Provider Office Phone # Fax #    [facility] [phone] [address] [address]        Equal Access to Services     MOOKIE HARDIN : Hadii aad ku hadasho Soomaali, waaxda luqadaha, qaybta kaalmada adeegyada, waxay idiin hayaan adedrew brenner . So [facility] [phone].    ATENCIÓN: Si habla español, tiene a rodriguez disposición servicios gratuitos de asistencia lingüística. Llame al [phone].    We comply with applicable federal civil rights laws and [place] laws. We do not discriminate on the basis of race, color, national origin, age, disability, sex, sexual orientation, or gender identity.            Thank you!     Thank you for choosing [facility]  for your care. Our goal is always to provide you with excellent care. Hearing back from our patients is one way we can continue to improve our services. Please take a few minutes to complete the written survey that you may receive in the mail after your visit with us. Thank you!             Your Updated Medication List - Protect others around you: Learn how to safely use, store and throw away your medicines at www.disposemymeds.org.          This list is accurate as of 5/1/18 11:59 PM.  Always use your most recent med list.                   Brand Name Dispense Instructions for use Diagnosis    norgestim-eth estrad triphasic 0.18/0.215/0.25 MG-35 MCG per tablet    TRI-SPRINTEC    30 tablet    Take 1 tablet by mouth daily    Encounter for surveillance of contraceptive pills

## 2018-05-07 LAB — COPATH REPORT: NORMAL

## 2019-01-08 DIAGNOSIS — Z30.41 ENCOUNTER FOR SURVEILLANCE OF CONTRACEPTIVE PILLS: ICD-10-CM

## 2019-01-08 RX ORDER — NORGESTIMATE AND ETHINYL ESTRADIOL 7DAYSX3 28
1 KIT ORAL DAILY
Qty: 30 TABLET | Refills: 5 | Status: SHIPPED | OUTPATIENT
Start: 2019-01-08 | End: 2019-02-01

## 2019-01-08 NOTE — TELEPHONE ENCOUNTER
Reason for Call:  Medication or medication refill:    Do you use a [facility]?  Name of the pharmacy and phone number for the current request[facility]    Name of the medication requested: norgestim-eth estrad triphasic (TRI-SPRINTEC) 0.18/0.215/0.25 MG-35 MCG per tablet    Other request: Patient states she has 4 pills left.    Can we leave a detailed message on this number? YES    Phone number patient can be reached at: Home number on file [phone] (home)    Best Time: any    Call taken on 1/8/2019 at 9:31 AM by GEORGINA STEVENS

## 2019-01-08 NOTE — TELEPHONE ENCOUNTER
Prescription approved per [facility] Refill Protocol.     Pt is due for an appt in 5/2019.    Detailed message left on pts vm.     Sultana BROOKS RN

## 2019-01-29 ENCOUNTER — OFFICE VISIT (OUTPATIENT)
Dept: OBGYN | Facility: CLINIC | Age: 29
End: 2019-01-29
Payer: COMMERCIAL

## 2019-01-29 VITALS — DIASTOLIC BLOOD PRESSURE: 60 MMHG | SYSTOLIC BLOOD PRESSURE: 116 MMHG | WEIGHT: 124 LBS | BODY MASS INDEX: 20.63 KG/M2

## 2019-01-29 DIAGNOSIS — R87.610 ASCUS WITH POSITIVE HIGH RISK HPV CERVICAL: Primary | ICD-10-CM

## 2019-01-29 DIAGNOSIS — R87.810 ASCUS WITH POSITIVE HIGH RISK HPV CERVICAL: Primary | ICD-10-CM

## 2019-01-29 DIAGNOSIS — N92.6 IRREGULAR BLEEDING: ICD-10-CM

## 2019-01-29 PROCEDURE — 99214 OFFICE O/P EST MOD 30 MIN: CPT | Performed by: OBSTETRICS & GYNECOLOGY

## 2019-01-29 PROCEDURE — 88175 CYTOPATH C/V AUTO FLUID REDO: CPT | Performed by: OBSTETRICS & GYNECOLOGY

## 2019-01-29 PROCEDURE — 87591 N.GONORRHOEAE DNA AMP PROB: CPT | Performed by: OBSTETRICS & GYNECOLOGY

## 2019-01-29 PROCEDURE — 87624 HPV HI-RISK TYP POOLED RSLT: CPT | Performed by: OBSTETRICS & GYNECOLOGY

## 2019-01-29 PROCEDURE — 87491 CHLMYD TRACH DNA AMP PROBE: CPT | Performed by: OBSTETRICS & GYNECOLOGY

## 2019-01-29 NOTE — NURSING NOTE
"Chief Complaint   Patient presents with     Physical     pap due       Initial /60 (BP Location: Left arm, Cuff Size: Adult Regular)   Wt 56.2 kg (124 lb)   LMP 2019 (Approximate)   BMI 20.63 kg/m   Estimated body mass index is 20.63 kg/m  as calculated from the following:    Height as of 18: 1.651 m (5' 5\").    Weight as of this encounter: 56.2 kg (124 lb).  BP completed using cuff size: regular    Questioned patient about current smoking habits.  Pt. has never smoked.          The following HM Due: pap smear      Tracy Cooper CMA               "

## 2019-01-29 NOTE — PROGRESS NOTES
SUBJECTIVE:  Matthias Quarles is a 28 year old, single female,  P0  who presents for intermenstrual spotting. Taking triphasic Ocassionaly forgets to take ocp.. Patient's last menstrual period was 2019 (approximate). Periods are regular q 28-30 days, lasting 5 days. Dysmenorrhea varies.. Saturates 1 pad or tampon in 3 hours.  Cyclic symptoms include  None. Notes midcycle spotting.  Current contraception: oral contraceptives  History of abnormal Pap smear: Yes: colposcopy  Regular self breast exam: No  Family history of breast cancer: No. Colon cancer No. Ovarian cancer No.  History of abnormal lipids: No      Past Medical History:   Diagnosis Date     ASCUS with positive high risk HPV cervical 2018 ASCUS, +HR HPV, not 16/18      Chronic leukopenia     benign; has been evaluated by heme in past (2012/); no intervention indicated; regular monitoring NOT indicated (only check as clinically indicated)       Past Surgical History:   Procedure Laterality Date     NO HISTORY OF SURGERY         Current Outpatient Medications   Medication     norgestim-eth estrad triphasic (TRI-SPRINTEC) 0.18/0.215/0.25 MG-35 MCG tablet     No current facility-administered medications for this visit.      No Known Allergies    Social History     Tobacco Use     Smoking status: Never Smoker     Smokeless tobacco: Never Used   Substance Use Topics     Alcohol use: Yes     Comment: social - 1-2 drinks when going out       Review of Systems    CONSTITUTIONAL:NEGATIVE  EYES: NEGATIVE  ENT/MOUTH: NEGATIVE  RESP: NEGATIVE  CV: NEGATIVE  GI: NEGATIVE  : NEGATIVE  MUSCULOSKELATAL: NEGATIVE  INTEGUMENTARY/SKIN: NEGATIVE  BREAST: NEGATIVE  NEURO: NEGATIVE.      OBJECTIVE:  /60 (BP Location: Left arm, Cuff Size: Adult Regular)   Wt 56.2 kg (124 lb)   LMP 2019 (Approximate)   BMI 20.63 kg/m    General appearance: Healthy.  Skin: Normal.  Mental Status: cooperative, normal affect, no gross thought process  defects.  Pelvis: normal external genitalia, normal groin lymphatics, normal urethral meatus, normal vaginal mucosa, normal cervix, normal adnexa, no masses or tenderness, uterus normal size and shape and uterus antiverted.   Extremities: Normal     ASSESSMENT:   Intermenstrual bleeding.    PLAN:    1) Pap smear  2) Mammography, lipids at appropriate intervals  3) Currently using a triphasic ocp. Recommended changing to monophasic ocp. Gyn  Probe done but dooubt STD. Total time spent was 25 minutes. 25 minutes of face to face time spent counseling and or coordination of care regarding intermenstraul bleeding .       PE: reviewed health maintenance including diet, regular exercise and periodic exams.

## 2019-01-31 LAB
COPATH REPORT: NORMAL
PAP: NORMAL

## 2019-02-01 ENCOUNTER — OFFICE VISIT (OUTPATIENT)
Dept: INTERNAL MEDICINE | Facility: CLINIC | Age: 29
End: 2019-02-01
Payer: COMMERCIAL

## 2019-02-01 VITALS
DIASTOLIC BLOOD PRESSURE: 68 MMHG | RESPIRATION RATE: 18 BRPM | OXYGEN SATURATION: 98 % | HEART RATE: 80 BPM | SYSTOLIC BLOOD PRESSURE: 98 MMHG | HEIGHT: 65 IN | WEIGHT: 121 LBS | BODY MASS INDEX: 20.16 KG/M2

## 2019-02-01 DIAGNOSIS — Z00.01 ENCOUNTER FOR ROUTINE ADULT MEDICAL EXAM WITH ABNORMAL FINDINGS: Primary | ICD-10-CM

## 2019-02-01 DIAGNOSIS — Z30.41 ENCOUNTER FOR SURVEILLANCE OF CONTRACEPTIVE PILLS: ICD-10-CM

## 2019-02-01 DIAGNOSIS — R14.0 ABDOMINAL BLOATING: ICD-10-CM

## 2019-02-01 DIAGNOSIS — M79.89 BILATERAL HAND SWELLING: ICD-10-CM

## 2019-02-01 DIAGNOSIS — Z13.1 SCREENING FOR DIABETES MELLITUS: ICD-10-CM

## 2019-02-01 DIAGNOSIS — Z13.220 SCREENING FOR CHOLESTEROL LEVEL: ICD-10-CM

## 2019-02-01 DIAGNOSIS — Z13.29 SCREENING FOR THYROID DISORDER: ICD-10-CM

## 2019-02-01 DIAGNOSIS — R22.0 FACIAL SWELLING: ICD-10-CM

## 2019-02-01 DIAGNOSIS — K59.04 CHRONIC IDIOPATHIC CONSTIPATION: ICD-10-CM

## 2019-02-01 DIAGNOSIS — Z13.0 SCREENING FOR BLOOD DISEASE: ICD-10-CM

## 2019-02-01 PROBLEM — R87.610 ASCUS WITH POSITIVE HIGH RISK HPV CERVICAL: Status: RESOLVED | Noted: 2018-01-23 | Resolved: 2019-02-01

## 2019-02-01 PROBLEM — R87.810 ASCUS WITH POSITIVE HIGH RISK HPV CERVICAL: Status: RESOLVED | Noted: 2018-01-23 | Resolved: 2019-02-01

## 2019-02-01 LAB
ALBUMIN SERPL-MCNC: 3.8 G/DL (ref 3.4–5)
ALBUMIN UR-MCNC: NEGATIVE MG/DL
ALP SERPL-CCNC: 40 U/L (ref 40–150)
ALT SERPL W P-5'-P-CCNC: 22 U/L (ref 0–50)
ANION GAP SERPL CALCULATED.3IONS-SCNC: 6 MMOL/L (ref 3–14)
APPEARANCE UR: CLEAR
AST SERPL W P-5'-P-CCNC: 18 U/L (ref 0–45)
BACTERIA #/AREA URNS HPF: ABNORMAL /HPF
BILIRUB SERPL-MCNC: 0.4 MG/DL (ref 0.2–1.3)
BILIRUB UR QL STRIP: ABNORMAL
BUN SERPL-MCNC: 6 MG/DL (ref 7–30)
CALCIUM SERPL-MCNC: 8.8 MG/DL (ref 8.5–10.1)
CHLORIDE SERPL-SCNC: 107 MMOL/L (ref 94–109)
CHOLEST SERPL-MCNC: 214 MG/DL
CO2 SERPL-SCNC: 26 MMOL/L (ref 20–32)
COLOR UR AUTO: YELLOW
CREAT SERPL-MCNC: 0.7 MG/DL (ref 0.52–1.04)
ERYTHROCYTE [DISTWIDTH] IN BLOOD BY AUTOMATED COUNT: 12.3 % (ref 10–15)
GFR SERPL CREATININE-BSD FRML MDRD: >90 ML/MIN/{1.73_M2}
GLUCOSE SERPL-MCNC: 94 MG/DL (ref 70–99)
GLUCOSE UR STRIP-MCNC: NEGATIVE MG/DL
HCT VFR BLD AUTO: 40.8 % (ref 35–47)
HDLC SERPL-MCNC: 59 MG/DL
HGB BLD-MCNC: 13.8 G/DL (ref 11.7–15.7)
HGB UR QL STRIP: ABNORMAL
KETONES UR STRIP-MCNC: NEGATIVE MG/DL
LDLC SERPL CALC-MCNC: 141 MG/DL
LEUKOCYTE ESTERASE UR QL STRIP: NEGATIVE
MCH RBC QN AUTO: 29.9 PG (ref 26.5–33)
MCHC RBC AUTO-ENTMCNC: 33.8 G/DL (ref 31.5–36.5)
MCV RBC AUTO: 89 FL (ref 78–100)
NITRATE UR QL: NEGATIVE
NON-SQ EPI CELLS #/AREA URNS LPF: ABNORMAL /LPF
NONHDLC SERPL-MCNC: 155 MG/DL
PH UR STRIP: 6 PH (ref 5–7)
PLATELET # BLD AUTO: 199 10E9/L (ref 150–450)
POTASSIUM SERPL-SCNC: 5.1 MMOL/L (ref 3.4–5.3)
PROT SERPL-MCNC: 7.6 G/DL (ref 6.8–8.8)
RBC # BLD AUTO: 4.61 10E12/L (ref 3.8–5.2)
RBC #/AREA URNS AUTO: ABNORMAL /HPF
SODIUM SERPL-SCNC: 139 MMOL/L (ref 133–144)
SOURCE: ABNORMAL
SP GR UR STRIP: 1.02 (ref 1–1.03)
TRIGL SERPL-MCNC: 68 MG/DL
TSH SERPL DL<=0.005 MIU/L-ACNC: 3.99 MU/L (ref 0.4–4)
UROBILINOGEN UR STRIP-ACNC: 0.2 EU/DL (ref 0.2–1)
WBC # BLD AUTO: 3.8 10E9/L (ref 4–11)
WBC #/AREA URNS AUTO: ABNORMAL /HPF

## 2019-02-01 PROCEDURE — 81001 URINALYSIS AUTO W/SCOPE: CPT | Performed by: INTERNAL MEDICINE

## 2019-02-01 PROCEDURE — 80053 COMPREHEN METABOLIC PANEL: CPT | Performed by: INTERNAL MEDICINE

## 2019-02-01 PROCEDURE — 84443 ASSAY THYROID STIM HORMONE: CPT | Performed by: INTERNAL MEDICINE

## 2019-02-01 PROCEDURE — 80061 LIPID PANEL: CPT | Performed by: INTERNAL MEDICINE

## 2019-02-01 PROCEDURE — 85027 COMPLETE CBC AUTOMATED: CPT | Performed by: INTERNAL MEDICINE

## 2019-02-01 PROCEDURE — 99213 OFFICE O/P EST LOW 20 MIN: CPT | Mod: 25 | Performed by: INTERNAL MEDICINE

## 2019-02-01 PROCEDURE — 99395 PREV VISIT EST AGE 18-39: CPT | Performed by: INTERNAL MEDICINE

## 2019-02-01 PROCEDURE — 36415 COLL VENOUS BLD VENIPUNCTURE: CPT | Performed by: INTERNAL MEDICINE

## 2019-02-01 RX ORDER — POLYETHYLENE GLYCOL 3350 17 G/17G
1 POWDER, FOR SOLUTION ORAL DAILY
Qty: 1530 G | Refills: 3 | Status: SHIPPED | OUTPATIENT
Start: 2019-02-01 | End: 2019-09-04

## 2019-02-01 RX ORDER — NORGESTIMATE AND ETHINYL ESTRADIOL 7DAYSX3 28
1 KIT ORAL DAILY
Qty: 84 TABLET | Refills: 3 | Status: SHIPPED | OUTPATIENT
Start: 2019-02-01 | End: 2019-09-04

## 2019-02-01 SDOH — HEALTH STABILITY: MENTAL HEALTH: HOW OFTEN DO YOU HAVE A DRINK CONTAINING ALCOHOL?: 2-4 TIMES A MONTH

## 2019-02-01 SDOH — HEALTH STABILITY: MENTAL HEALTH: HOW MANY STANDARD DRINKS CONTAINING ALCOHOL DO YOU HAVE ON A TYPICAL DAY?: 1 OR 2

## 2019-02-01 SDOH — HEALTH STABILITY: MENTAL HEALTH: HOW OFTEN DO YOU HAVE SIX OR MORE DRINKS ON ONE OCCASION?: NEVER

## 2019-02-01 SDOH — HEALTH STABILITY: MENTAL HEALTH: HOW OFTEN DO YOU HAVE 6 OR MORE DRINKS ON ONE OCCASION?: NEVER

## 2019-02-01 SDOH — HEALTH STABILITY: MENTAL HEALTH: HOW MANY DRINKS CONTAINING ALCOHOL DO YOU HAVE ON A TYPICAL DAY WHEN YOU ARE DRINKING?: 1 OR 2

## 2019-02-01 ASSESSMENT — MIFFLIN-ST. JEOR: SCORE: 1281.79

## 2019-02-01 NOTE — PROGRESS NOTES
SUBJECTIVE:                                                      HPI: Matthias Quarles is a pleasant 28 year old female who presents for a physical.    Complains of facial and hand swelling first thing in the morning when she wakes up. Swelling resolves after several hours and with cool compresses. Patient is very careful with her sodium intake. No recent diet changes. No medication changes, prescription or OTC. No unusual urinary symptoms.    Also complains of chronic abdominal bloating after eating. Has tried eliminating multiple things, including dairy and gluten, from her diet to no avail. No nausea or vomiting. No diarrhea, melena, hematochezia. Patient does endorse chronic constipation, with bowel movement every 4-5 days.    Patient also needs refills of birth control pill.    ROS:  Constitutional: denies unintentional weight loss or gain; denies fevers, chills, or sweats     Cardiovascular: denies chest pain, palpitations, or edema  Respiratory: denies cough, wheezing, shortness of breath, or dyspnea on exertion  Gastrointestinal: see above  Genitourinary: denies urinary frequency, urgency, dysuria, or hematuria  Integumentary: denies rash or pruritus  Musculoskeletal: denies back pain, muscle pain, joint pain, or joint swelling  Neurologic: denies focal weakness, numbness, or tingling  Hematologic/Immunologic: denies history of anemia or blood transfusions  Endocrine: denies heat or cold intolerance; denies polyuria, polydipsia  Psychiatric: denies anxiety; see preventative health below    Past Medical History:   Diagnosis Date     Chronic leukopenia     benign; has been evaluated by heme in past (12/2012/); no intervention indicated; regular monitoring NOT indicated (only check as clinically indicated)     Past Surgical History:   Procedure Laterality Date     NO HISTORY OF SURGERY       Family History   Problem Relation Age of Onset     Heart Disease Maternal Grandfather         details unknown     Diabetes  "Type 2  Paternal Grandfather      Cerebrovascular Disease Paternal Grandfather         later in life     Heart Surgery Maternal Uncle         details unknown     Myocardial Infarction No family hx of      Coronary Artery Disease Early Onset No family hx of      Colon Cancer No family hx of      Breast Cancer No family hx of      Ovarian Cancer No family hx of      Social History     Occupational History     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     No kids.    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      No Known Allergies     Current Outpatient Medications   Medication Sig     norgestim-eth estrad triphasic (TRI-SPRINTEC) 0.18/0.215/0.25 MG-35 MCG tablet Take 1 tablet by mouth daily     Immunization History   Administered Date(s) Administered     DTAP (<7y) 07/21/1994, 09/20/1994, 11/10/1994, 02/12/1996, 06/30/1997     HEPA 01/02/2014     HPV 08/20/2007, 10/31/2007, 06/09/2011     HepB [birth year], 05/17/1993     Hib (PRP-T) 1990, 1990, 02/18/1993, 12/16/1993, 08/22/1995     Influenza Vaccine IM 3yrs+ 4 Valent IIV4 01/02/2014     MMR 08/22/1995, 06/19/1999     Mantoux Tuberculin Skin Test 10/22/2013     Meningococcal (Menactra ) 08/20/2007, 12/06/2012     Poliovirus, inactivated (IPV) 07/21/1994, 09/20/1994, 02/12/1996, 06/30/1997, 01/02/2014     TD (ADULT, 7+) 08/04/2004     TDAP Vaccine (Adacel) 06/09/2011     Typhoid IM 01/02/2014     Varicella 08/22/1995, 08/20/2007     Yellow Fever 01/02/2014     OBJECTIVE:                                                      BP 98/68   Pulse 80   Resp 18   Ht 1.638 m (5' 4.5\")   Wt 54.9 kg (121 lb)   LMP 01/06/2019 (Approximate)   SpO2 98%   " BMI 20.45 kg/m    Constitutional: well-appearing  Eyes: normal conjunctivae and lids; pupils equal, round, and reactive to light  Ears, Nose, Mouth, and Throat: normal ears and nose; tympanic membranes visualized and normal; normal lips, teeth, and gums; no oropharyngeal lesions or ulcers  Neck: supple and symmetric; no lymphadenopathy; no thyromegaly or masses  Respiratory: normal respiratory effort; clear to auscultation bilaterally  Cardiovascular: regular rate and rhythm; pedal pulses palpable; no edema  Gastrointestinal: soft, non-tender, non-distended, and bowel sounds present; no organomegaly or masses  Musculoskeletal: normal gait and station  Psych: normal judgment and insight; normal mood and affect; recent and remote memory intact; oriented to time, place, and person    PREVENTATIVE HEALTH                                                      BMI: within normal limits   Blood pressure: within normal limits   Breast CA screening: not medically indicated at this time   Cervical CA screening: up to date   Colon CA screening: not medically indicated at this time   Lung CA screening: n/a   Dexa: not medically indicated at this time   Screening HCV: n/a   Screening HIV: completed  Screening cholesterol: DUE  Screening diabetes: DUE  STD testing: no risk factors present  Depression screening: PHQ-2 assessment completed and reviewed - no intervention indicated at this time  Alcohol misuse screening: alcohol use reviewed - no intervention indicated at this time  Immunizations: reviewed; flu shot DUE - patient declines    ASSESSMENT/PLAN:                                                       (Z00.01) Encounter for routine adult medical exam with abnormal findings  (primary encounter diagnosis)  Comment: PMH, PSH, FH, SH, medications, allergies, immunizations, and preventative health measures reviewed.   Plan: see below for plans.    (Z13.220) Screening for cholesterol level  (Z13.1) Screening for diabetes  mellitus  (Z13.29) Screening for thyroid disorder  (Z13.0) Screening for blood disease  Plan: fasting labs today.    (R22.0) Facial swelling  (M79.89) Bilateral hand swelling  Comment: not noted on exam today.  Plan: CBC, CMP, TSH reflex, and UA micro today to rule out organic cause of swelling (like nephrotic syndrome, kidney, thyroid, or liver dysfunction, and/or electrolyte abnormality).    (R14.0) Abdominal bloating  (K59.04) Chronic idiopathic constipation  Comment: suspect bloating is related to chronic constipation.  Plan:    - CBC, CMP, TSH reflex and H. pylori stool sample today to rule out organic causes of constipation.   - TRIAL of daily Miralax.   - if labs unrevealing or continued constipation or symptoms in spite of Miralax, patient to contact MD.    (Z30.41) Encounter for surveillance of contraceptive pills  Plan: refills of OCP provided.     The instructions on the AVS were discussed and explained to the patient. Patient expressed understanding of instructions.    (Chart documentation was completed, in part, with [employer] voice-recognition software. Even though reviewed, some grammatical, spelling, and word errors may remain.)    Bailey Fraser MD   [address] [address]  T: [phone], F: [phone]

## 2019-02-01 NOTE — PATIENT INSTRUCTIONS
Urine sample today.     Labs and stool kit downstairs.    Daily Miralax - use daily and consistently - goal is daily, soft, brown, formed, and easy to evacuate stools.     Refills of birth control sent to pharmacy.

## 2019-02-12 LAB
FINAL DIAGNOSIS: NORMAL
HPV HR 12 DNA CVX QL NAA+PROBE: NEGATIVE
HPV16 DNA SPEC QL NAA+PROBE: NEGATIVE
HPV18 DNA SPEC QL NAA+PROBE: NEGATIVE
SPECIMEN DESCRIPTION: NORMAL
SPECIMEN SOURCE CVX/VAG CYTO: NORMAL

## 2019-02-19 ENCOUNTER — TELEPHONE (OUTPATIENT)
Dept: OBGYN | Facility: CLINIC | Age: 29
End: 2019-02-19

## 2019-02-19 NOTE — TELEPHONE ENCOUNTER
"Dr. Lyons - please advise.  Pap result notes copied and pasted below [2nd request for provider review]:    \"Notes recorded by Kaci Biggs, RN on 2/13/2019 at 8:44 AM CST  Hi Dr. Lyons,  27 yo with a current NIL pap, Neg HR HPV result. She also has this listed in her problem list, Chronic Leukopenia, \"benign; has been evaluated by heme in past (12/2012/); no intervention indicated; regular monitoring NOT indicated (only check as clinically indicated).\" Okay to recommend a pap in 3 year per guidelines or would you like other follow up? Please advise.   Thanks,  Kaci Biggs, RN-Pap Tracking     Pap Hx:  1/23/18 ASCUS, +HR HPV, not 16/18. Plan colp   5/1/18 [facility] ECC: neg. Plan 1 year cotest  1/29/2019: Pap: NIL, Neg HR HPV result. Plan provider review. \"       Thanks!  CLARA Fernandez, RN - Pap Tracking    "

## 2019-05-17 ENCOUNTER — OFFICE VISIT (OUTPATIENT)
Dept: URGENT CARE | Facility: URGENT CARE | Age: 29
End: 2019-05-17
Payer: COMMERCIAL

## 2019-05-17 VITALS
SYSTOLIC BLOOD PRESSURE: 110 MMHG | HEIGHT: 65 IN | BODY MASS INDEX: 33.17 KG/M2 | WEIGHT: 199.1 LBS | TEMPERATURE: 98.8 F | OXYGEN SATURATION: 100 % | DIASTOLIC BLOOD PRESSURE: 60 MMHG | RESPIRATION RATE: 16 BRPM | HEART RATE: 58 BPM

## 2019-05-17 DIAGNOSIS — H92.03 OTALGIA OF BOTH EARS: ICD-10-CM

## 2019-05-17 DIAGNOSIS — R00.0 TACHYCARDIA: ICD-10-CM

## 2019-05-17 DIAGNOSIS — R07.0 THROAT PAIN: Primary | ICD-10-CM

## 2019-05-17 LAB
DEPRECATED S PYO AG THROAT QL EIA: NORMAL
HETEROPH AB SER QL: NEGATIVE
SPECIMEN SOURCE: NORMAL

## 2019-05-17 PROCEDURE — 99214 OFFICE O/P EST MOD 30 MIN: CPT | Performed by: FAMILY MEDICINE

## 2019-05-17 PROCEDURE — 87880 STREP A ASSAY W/OPTIC: CPT | Performed by: FAMILY MEDICINE

## 2019-05-17 PROCEDURE — 86308 HETEROPHILE ANTIBODY SCREEN: CPT | Performed by: FAMILY MEDICINE

## 2019-05-17 PROCEDURE — 36415 COLL VENOUS BLD VENIPUNCTURE: CPT | Performed by: FAMILY MEDICINE

## 2019-05-17 PROCEDURE — 87081 CULTURE SCREEN ONLY: CPT | Performed by: FAMILY MEDICINE

## 2019-05-17 ASSESSMENT — MIFFLIN-ST. JEOR: SCORE: 1643.99

## 2019-05-17 NOTE — LETTER
[facility]  [address] [address]  Phone: [phone]    05/17/19    Matthias Quarles  [address]  [facility] [address]      To whom it may concern:     Matthias Quarles was seen in the clinic for current illness , she needs to be off from work today and tomorrow.      Sincerely,      Patricia Hawthorne MD

## 2019-05-17 NOTE — PROGRESS NOTES
Chief Complaint   Patient presents with     Pharyngitis     sore throat, ear ache, hasn't had anything to eat/drink in about 24 hrs, rapid heart rate yesterday, sweating , hard beats for about an hour     SUBJECTIVE:   Matthias Quarles is a 28 year old female presenting with a chief complaint of ear pain bilateral and sore throat. She is an established patient of [facility].  Onset of symptoms was 1 day(s) ago.  She also had an episode of rapid heartbeat which lasted for couple of minutes she thinks her heart rate was up to 140 bpm it resolved on its own.  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    Severity moderate  Current and Associated symptoms: ear pain bilateral and sore throat  Treatment measures tried include Tylenol/Ibuprofen.  Predisposing factors include None.    Past Medical History:   Diagnosis Date     ASCUS with positive high risk HPV cervical 1/23/2018 1/23/18 ASCUS, +HR HPV, not 16/18. Plan colp  5/1/18 [facility] ECC: neg. Plan 1 year cotest 1/29/2019: Pap: NIL, Neg HR HPV result. Plan provider review.      Chronic leukopenia     benign; has been evaluated by heme in past (12/2012/); no intervention indicated; regular monitoring NOT indicated (only check as clinically indicated)     Current Outpatient Medications   Medication Sig Dispense Refill     norgestim-eth estrad triphasic (TRI-SPRINTEC) 0.18/0.215/0.25 MG-35 MCG tablet Take 1 tablet by mouth daily 84 tablet 3     polyethylene glycol (MIRALAX/GLYCOLAX) powder Take 17 g (1 capful) by mouth daily (Patient not taking: Reported on 5/17/2019) 1530 g 3     Social History     Tobacco Use     Smoking status: Never Smoker     Smokeless tobacco: Never Used   Substance Use Topics      "Alcohol use: Yes     Frequency: 2-4 times a month     Drinks per session: 1 or 2     Binge frequency: Never     Family History   Problem Relation Age of Onset     Heart Disease Maternal Grandfather         details unknown     Diabetes Type 2  Paternal Grandfather      Cerebrovascular Disease Paternal Grandfather         later in life     Heart Surgery Maternal Uncle         details unknown     Myocardial Infarction No family hx of      Coronary Artery Disease Early Onset No family hx of      Colon Cancer No family hx of      Breast Cancer No family hx of      Ovarian Cancer No family hx of          ROS:    10 point ROS of systems including Constitutional, Eyes, Respiratory, Cardiovascular, Gastroenterology, Genitourinary, Integumentary, Muscularskeletal, Psychiatric were all negative except for pertinent positives noted in my HPI         OBJECTIVE:  /60   Pulse 58   Temp 98.8  F (37.1  C)   Resp 16   Ht 1.651 m (5' 5\")   Wt 90.3 kg (199 lb 1.6 oz)   SpO2 100%   BMI 33.13 kg/m    GENERAL APPEARANCE: healthy, alert and no distress  EYES: EOMI,  PERRL, conjunctiva clear  HENT: ear canals and TM's normal.  Nose and mouth without ulcers, erythema or lesions  NECK: supple, nontender, no lymphadenopathy  RESP: lungs clear to auscultation - no rales, rhonchi or wheezes  CV: regular rates and rhythm, normal S1 S2, no murmur noted  ABDOMEN:  soft, nontender, no HSM or masses and bowel sounds normal  SKIN: no suspicious lesions or rashes  PSYCH: mentation appears normal  Physical Exam      X-Ray was not done.    Medical Decision Making:    Differential Diagnosis:  URI Adult/Peds:  Strep pharyngitis, Viral pharyngitis, Viral syndrome and Viral upper respiratory illness      ASSESSMENT:  Matthias was seen today for pharyngitis.    Diagnoses and all orders for this visit:    Throat pain  -     Strep, Rapid Screen  -     Mononucleosis screen  -     Beta strep group A culture    Otalgia of both " ears    Tachycardia      Mono neg     PLAN:  Tylenol, Ibuprofen, Fluids, Rest, Saline gargles  Patient was notified about negative strep result  Discussed with patient symptoms could be related to a viral URI  I doubt its flu as she has no fever  Symptoms continue to worsen should follow-up for further evaluation and treatment  For tachycardia did discuss with patient it could have been an episode of sinus tachycardia did review the risks with excessive caffeine or and dehydration that can cause the episodes of rapid heart rate  I did discuss that if symptoms continue to come back in regards to tachycardia then should get an EKG and also possible Holter monitoring    See orders in Epic

## 2019-05-18 LAB
BACTERIA SPEC CULT: NORMAL
SPECIMEN SOURCE: NORMAL

## 2019-05-22 ENCOUNTER — OFFICE VISIT (OUTPATIENT)
Dept: INTERNAL MEDICINE | Facility: CLINIC | Age: 29
End: 2019-05-22
Payer: COMMERCIAL

## 2019-05-22 VITALS
WEIGHT: 120 LBS | BODY MASS INDEX: 19.97 KG/M2 | SYSTOLIC BLOOD PRESSURE: 94 MMHG | DIASTOLIC BLOOD PRESSURE: 60 MMHG | RESPIRATION RATE: 18 BRPM | HEART RATE: 74 BPM | OXYGEN SATURATION: 99 % | TEMPERATURE: 97.9 F

## 2019-05-22 DIAGNOSIS — L23.9 CONTACT ALLERGIC REACTION: ICD-10-CM

## 2019-05-22 DIAGNOSIS — H66.92 ACUTE INFECTION OF LEFT EAR: Primary | ICD-10-CM

## 2019-05-22 PROCEDURE — 99213 OFFICE O/P EST LOW 20 MIN: CPT | Performed by: PHYSICIAN ASSISTANT

## 2019-05-22 RX ORDER — AMOXICILLIN 875 MG
875 TABLET ORAL 2 TIMES DAILY
Qty: 14 TABLET | Refills: 0 | Status: SHIPPED | OUTPATIENT
Start: 2019-05-22 | End: 2019-09-04

## 2019-05-22 NOTE — PROGRESS NOTES
Subjective     Matthias Quarles is a 28 year old female who presents to clinic today for the following health issues:    HPI   Concern - Ear pain  Onset: 1 week    Description:   Sore throat, left ear pain, feels as though mouth is itchy    Intensity: moderate    Progression of Symptoms:  improving and same    Accompanying Signs & Symptoms:  fatigue    Previous history of similar problem:    Seen in  on 5/17/19    Precipitating factors:   Worsened by: None    Alleviating factors:  Improved by: None    Therapies Tried and outcome: Tylenol, aleve, chloraseptic spray- short term relief only  BP Readings from Last 3 Encounters:   05/22/19 94/60   05/17/19 110/60   02/01/19 98/68    Wt Readings from Last 3 Encounters:   05/22/19 54.4 kg (120 lb)   05/17/19 90.3 kg (199 lb 1.6 oz)   02/01/19 54.9 kg (121 lb)                    -------------------------------------  Reviewed and updated as needed this visit by Provider  Allergies  Meds         Review of Systems   ROS COMP: Constitutional, HEENT, cardiovascular, pulmonary, gi and gu systems are negative, except as otherwise noted.      Objective    BP 94/60 (BP Location: Left arm, Patient Position: Chair, Cuff Size: Adult Regular)   Pulse 74   Temp 97.9  F (36.6  C) (Oral)   Resp 18   Wt 54.4 kg (120 lb)   SpO2 99%   BMI 19.97 kg/m    Body mass index is 19.97 kg/m .  Physical Exam   GENERAL: healthy, alert and no distress  HENT: normal cephalic/atraumatic, left ear: clear effusion, erythematous and bulging membrane, nose and mouth without ulcers or lesions, oropharynx clear, oral mucous membranes moist and no swelling or lesions noted   NECK: no adenopathy, no asymmetry, masses, or scars and thyroid normal to palpation  RESP: lungs clear to auscultation - no rales, rhonchi or wheezes  CV: regular rates and rhythm and normal S1 S2, no S3 or S4  MS: no gross musculoskeletal defects noted, no edema  SKIN: no suspicious lesions or rashes    Diagnostic Test Results:  Labs  "reviewed in [facility]        Assessment & Plan     1. Acute infection of left ear    - amoxicillin (AMOXIL) 875 MG tablet; Take 1 tablet (875 mg) by mouth 2 times daily  Dispense: 14 tablet; Refill: 0    2. Contact allergic reaction  Stop oral rinses/chloraseptic, suspent local reaction given itchy mouth and lips sensation        BMI:   Estimated body mass index is 19.97 kg/m  as calculated from the following:    Height as of 5/17/19: 1.651 m (5' 5\").    Weight as of this encounter: 54.4 kg (120 lb).           Fluids  Reviewed ear congestion and persist for a few weeks after infection is cleared, but pain should improve with abx course      Return in about 2 weeks (around 6/5/2019) for recheck if not improving, regular primary provider.    Yamel Kay PA-C  [facility]    "

## 2019-09-04 ENCOUNTER — OFFICE VISIT (OUTPATIENT)
Dept: OBGYN | Facility: CLINIC | Age: 29
End: 2019-09-04
Payer: COMMERCIAL

## 2019-09-04 VITALS
WEIGHT: 117.2 LBS | SYSTOLIC BLOOD PRESSURE: 120 MMHG | BODY MASS INDEX: 19.53 KG/M2 | DIASTOLIC BLOOD PRESSURE: 70 MMHG | HEIGHT: 65 IN | HEART RATE: 100 BPM

## 2019-09-04 DIAGNOSIS — Z30.09 GENERAL COUNSELING AND ADVICE ON CONTRACEPTIVE MANAGEMENT: Primary | ICD-10-CM

## 2019-09-04 PROCEDURE — 99213 OFFICE O/P EST LOW 20 MIN: CPT | Performed by: NURSE PRACTITIONER

## 2019-09-04 SDOH — HEALTH STABILITY: MENTAL HEALTH: HOW OFTEN DO YOU HAVE A DRINK CONTAINING ALCOHOL?: 2-3 TIMES A WEEK

## 2019-09-04 ASSESSMENT — MIFFLIN-ST. JEOR: SCORE: 1267.5

## 2019-09-04 NOTE — PROGRESS NOTES
SUBJECTIVE:                                                   Matthias Quarles is a 29 year old who presents to clinic today for the following health issue(s):  Patient presents with:  Consult: Been taking Tri-sprintec for past 8-9 years and would like to ween off the medication.     Patient's last menstrual period was 08/21/2019 (within days).    HPI:  Matthias states that she has been on JULIAN for 10 years, since the age of 19.  She is concerned about long term affects of COCs and affect on her cycle.  She states that she would like to discuss stopping her COCs and possibly starting another form of birth control.  Matthias states that she does not desire to conceive at this time, but is considering in the next 2-3 years.     She has used the following methods in the past: JULIAN  Today she is interested in discussing Mirena IUD, Paragard IUD, Kim IUD and Nexplanon  Histories reviewed and updated  Past Medical History:   Diagnosis Date     ASCUS with positive high risk HPV cervical 1/23/2018 1/23/18 ASCUS, +HR HPV, not 16/18. Plan colp  5/1/18 [place] ECC: neg. Plan 1 year cotest 1/29/2019: Pap: NIL, Neg HR HPV result. Plan provider review.      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     Comment: [employer]   Social Needs     Financial resource strain: Not on file     Food insecurity:     Worry: Not on file     Inability: Not on file     Transportation needs:     Medical: Not on file     Non-medical:  Not on file   Tobacco Use     Smoking status: Never Smoker     Smokeless tobacco: Never Used   Substance and Sexual Activity     Alcohol use: Yes     Frequency: 2-3 times a week     Drinks per session: 1 or 2     Binge frequency: Never     Drug use: No     Sexual activity: Yes     Partners: Male     Birth control/protection: Condom   Lifestyle     Physical activity:     Days per week: Not on file     Minutes per session: Not on file     Stress: Not on file   Relationships     Social connections:     Talks on phone: Not on file     Gets together: Not on file     Attends Sikhism service: Not on file     Active member of club or organization: Not on file     Attends meetings of clubs or organizations: Not on file     Relationship status: Not on file     Intimate partner violence:     Fear of current or ex partner: Not on file     Emotionally abused: Not on file     Physically abused: Not on file     Forced sexual activity: Not on file   Other Topics Concern     Parent/sibling w/ CABG, MI or angioplasty before 65F 55M? Not Asked   Social History Narrative    Single.     No kids.    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      Family History   Problem Relation Age of Onset     Heart Disease Maternal Grandfather         details unknown     Diabetes Type 2  Paternal Grandfather      Cerebrovascular Disease Paternal Grandfather         later in life     Heart Surgery Maternal Uncle         details unknown     Myocardial Infarction No family hx of      Coronary Artery Disease Early Onset No family hx of      Colon Cancer No family hx of      Breast Cancer No family hx of      Ovarian Cancer No family hx of        Menstrual History:  Menses every 28 days.  Length of menses: 5 days  Menstrual description: crampy    Denies the following contraindications to estrogen/progesterone combined contraception:  Migraine with aura  Smoking over age 35  Liver disease  Personal history of blood clot or stroke   History of heart  "disease  History of breast cancer  Undiagnosed vaginal bleeding  Hypertension  Pregnancy    Denies the following contraindications to the IUD:  Distortion of the uterine cavity  Abelardo's disease/copper allergy  Active pelvic infection  Unexplained uterine bleeding  Known or suspected pregnancy  Breast cancer or liver disease    Health maintenance updated:  yes    ROS:   12 point review of systems negative other than symptoms noted below.  Genitourinary: Cramps and Irregular Menses    EXAM:  /70 (BP Location: Right arm, Patient Position: Sitting, Cuff Size: Adult Regular)   Pulse 100   Ht 1.651 m (5' 5\")   Wt 53.2 kg (117 lb 3.2 oz)   LMP 08/21/2019 (Within Days)   BMI 19.50 kg/m    Body mass index is 19.5 kg/m .    ASSESSMENT/PLAN:    ICD-10-CM    1. General counseling and advice on contraceptive management Z30.09      There are no contraindications to the use of JULIAN, Mirena IUD, Paragard IUD, Kim IUD, Nexplanon, Nuva Ring, condoms and Patch    COUNSELING:  Reviewed risks and benefits of contraceptive use  Matthias desires to use condoms at this time  Discussed proper use of chosen method  Handouts/Instrucions provided  Counseled to return to clinic for annual exam or sooner if other contraceptive measure desired    Regina ACUÑA CNM    "

## 2019-09-04 NOTE — PATIENT INSTRUCTIONS
Dysmenorrhea or painful menstruation     If you have painful periods consider trying the following:      Note on your calendar the beginning and end of each of your periods.       Try to anticipate your period by a couple days and take ibuprofen(Advil or Motrin) 800 mg three times a day for a day or two before your period starts or you can use naproxen (Aleve) 500 mg twice a day.       When your period starts continue with the ibuprofen for as long as you have cramping.       If the ibuprofen doesn't work completely you could add acetaminophen (Tylenol) 650 mg every four hours or 1000 mg every 6 hours      You can also use heat to your abdomen or back either in a tub or shower or with a heating pad.  You may also purchase Thermacare which stick to your abdomen or back and supply heat for up to 8 hours.       During your period try to get a good amount of sleep, eat a healthy well balanced diet, and make sure you drink plenty of water    If these methods do not improve you symptoms make an appointment to see your midwife to discuss hormonal options.    Please call with questions/concerns:    [facility]  [phone]      Patient Education     Levonorgestrel intrauterine device (IUD)  Brand Names: Kyleena, LILETTA, Mirena, Kim  What is this medicine?  LEVONORGESTREL IUD (COSMO voe michelle reese) is a contraceptive (birth control) device. The device is placed inside the uterus by a healthcare professional. It is used to prevent pregnancy. This device can also be used to treat heavy bleeding that occurs during your period.  How should I use this medicine?  This device is placed inside the uterus by a health care professional.  Talk to your pediatrician regarding the use of this medicine in children. Special care may be needed.  What side effects may I notice from receiving this medicine?  Side effects that you should report to your doctor or health care professional as soon as possible:    allergic  reactions like skin rash, itching or hives, swelling of the face, lips, or tongue    fever, flu-like symptoms    genital sores    high blood pressure    no menstrual period for 6 weeks during use    pain, swelling, warmth in the leg    pelvic pain or tenderness    severe or sudden headache    signs of pregnancy    stomach cramping    sudden shortness of breath    trouble with balance, talking, or walking    unusual vaginal bleeding, discharge    yellowing of the eyes or skin  Side effects that usually do not require medical attention (report to your doctor or health care professional if they continue or are bothersome):    acne    breast pain    change in sex drive or performance    changes in weight    cramping, dizziness, or faintness while the device is being inserted    headache    irregular menstrual bleeding within first 3 to 6 months of use    nausea  What may interact with this medicine?  Do not take this medicine with any of the following medications:    amprenavir    bosentan    fosamprenavir  This medicine may also interact with the following medications:    aprepitant    armodafinil    barbiturate medicines for inducing sleep or treating seizures    bexarotene    boceprevir    griseofulvin    medicines to treat seizures like carbamazepine, ethotoin, felbamate, oxcarbazepine, phenytoin, topiramate    modafinil    pioglitazone    rifabutin    rifampin    rifapentine    some medicines to treat HIV infection like atazanavir, efavirenz, indinavir, lopinavir, nelfinavir, tipranavir, ritonavir    Alexus's wort    warfarin  What if I miss a dose?  This does not apply. Depending on the brand of device you have inserted, the device will need to be replaced every 3 to 5 years if you wish to continue using this type of birth control.  Where should I keep my medicine?  This does not apply.  What should I tell my health care provider before I take this medicine?  They need to know if you have any of these  conditions:    abnormal Pap smear    cancer of the breast, uterus, or cervix    diabetes    endometritis    genital or pelvic infection now or in the past    have more than one sexual partner or your partner has more than one partner    heart disease    history of an ectopic or tubal pregnancy    immune system problems    IUD in place    liver disease or tumor    problems with blood clots or take blood-thinners    seizures    use intravenous drugs    uterus of unusual shape    vaginal bleeding that has not been explained    an unusual or allergic reaction to levonorgestrel, other hormones, silicone, or polyethylene, medicines, foods, dyes, or preservatives    pregnant or trying to get pregnant    breast-feeding  What should I watch for while using this medicine?  Visit your doctor or health care professional for regular check ups. See your doctor if you or your partner has sexual contact with others, becomes HIV positive, or gets a sexual transmitted disease.  This product does not protect you against HIV infection (AIDS) or other sexually transmitted diseases.  You can check the placement of the IUD yourself by reaching up to the top of your vagina with clean fingers to feel the threads. Do not pull on the threads. It is a good habit to check placement after each menstrual period. Call your doctor right away if you feel more of the IUD than just the threads or if you cannot feel the threads at all.  The IUD may come out by itself. You may become pregnant if the device comes out. If you notice that the IUD has come out use a backup birth control method like condoms and call your health care provider.  Using tampons will not change the position of the IUD and are okay to use during your period.  This IUD can be safely scanned with magnetic resonance imaging (MRI) only under specific conditions. Before you have an MRI, tell your healthcare provider that you have an IUD in place, and which type of IUD you have in  place.  NOTE:This sheet is a summary. It may not cover all possible information. If you have questions about this medicine, talk to your doctor, pharmacist, or health care provider. Copyright  2019 [employer]

## 2020-02-23 ENCOUNTER — HEALTH MAINTENANCE LETTER (OUTPATIENT)
Age: 30
End: 2020-02-23

## 2020-04-15 ENCOUNTER — MYC MEDICAL ADVICE (OUTPATIENT)
Dept: INTERNAL MEDICINE | Facility: CLINIC | Age: 30
End: 2020-04-15

## 2020-05-26 ENCOUNTER — VIRTUAL VISIT (OUTPATIENT)
Dept: INTERNAL MEDICINE | Facility: CLINIC | Age: 30
End: 2020-05-26
Payer: COMMERCIAL

## 2020-05-26 DIAGNOSIS — L98.9 SKIN LESION: Primary | ICD-10-CM

## 2020-05-26 PROCEDURE — 99213 OFFICE O/P EST LOW 20 MIN: CPT | Mod: GT | Performed by: PHYSICIAN ASSISTANT

## 2020-05-26 NOTE — PROGRESS NOTES
"Matthias Quarles is a 29 year old female who is being evaluated via a billable video visit.      The patient has been notified of following:     \"This video visit will be conducted via a call between you and your physician/provider. We have found that certain health care needs can be provided without the need for an in-person physical exam.  This service lets us provide the care you need with a video conversation.  If a prescription is necessary we can send it directly to your pharmacy.  If lab work is needed we can place an order for that and you can then stop by our lab to have the test done at a later time.    Video visits are billed at different rates depending on your insurance coverage.  Please reach out to your insurance provider with any questions.    If during the course of the call the physician/provider feels a video visit is not appropriate, you will not be charged for this service.\"    Patient has given verbal consent for Video visit? Yes    How would you like to obtain your AVS? Lyle    Patient would like the video invitation sent by: Text to cell phone: [phone]    Will anyone else be joining your video visit? No      Subjective     Matthias Quarles is a 29 year old female who presents today via video visit for the following health issues:    HPI  mass      Duration: years    Description (location/character/radiation): right eye under eyebrow    Intensity:  mild    Accompanying signs and symptoms: pt noticed it growing in size with in the last 1 1/2 months    History (similar episodes/previous evaluation): was seen when younger no concerns if it wasent huting pt    Precipitating or alleviating factors: None    Therapies tried and outcome: None       Video Start Time: 2:50 PM    Reviewed and updated as needed this visit by Provider           Objective    There were no vitals taken for this visit.  Estimated body mass index is 19.5 kg/m  as calculated from the following:    Height as of 9/4/19: 1.651 m " "(5' 5\").    Weight as of 9/4/19: 53.2 kg (117 lb 3.2 oz).  Physical Exam     GENERAL: Healthy, alert and no distress  EYES: Eyes grossly normal to inspection.  No discharge or erythema, or obvious scleral/conjunctival abnormalities.  NECK: No asymmetry, visible masses or scars  SKIN: skin color lump above right eye, just at the start of the brow, mobile - appears cystic   Diagnostic Test Results:  Labs reviewed in Epic        Assessment & Plan     1. Skin lesion  - small cyst above right eye, referral for further evaluation and possible removal   - DERMATOLOGY REFERRAL           No follow-ups on file.    Ame Mckeon PA-C  [facility]      Video-Visit Details    Type of service:  Video Visit    Video End Time:2:55 PM    Originating Location (pt. Location): Other [facility]le     Distant Location (provider location):  [facility]     Platform used for Video Visit: [employer]    No follow-ups on file.       Ame Mckeon PA-C        "

## 2020-06-05 ENCOUNTER — OFFICE VISIT (OUTPATIENT)
Dept: DERMATOLOGY | Facility: CLINIC | Age: 30
End: 2020-06-05
Payer: COMMERCIAL

## 2020-06-05 VITALS — OXYGEN SATURATION: 100 % | SYSTOLIC BLOOD PRESSURE: 113 MMHG | HEART RATE: 59 BPM | DIASTOLIC BLOOD PRESSURE: 77 MMHG

## 2020-06-05 DIAGNOSIS — L72.0 EPIDERMAL CYST: Primary | ICD-10-CM

## 2020-06-05 PROCEDURE — 99213 OFFICE O/P EST LOW 20 MIN: CPT | Performed by: PHYSICIAN ASSISTANT

## 2020-06-05 NOTE — PATIENT INSTRUCTIONS
This a cyst on your glabella (forehead)    If you decide you would like it gone, then schedule an appointment for a cyst excision with Dr Smith (at this clinic).

## 2020-06-05 NOTE — PROGRESS NOTES
HPI:   Chief complaints: Matthias Quarles is a 29 year old female who presents for evaluation of a possible lipoma on the forehead/eye area. Area has been present for about 13 years and is growing.   Condition present for:  13 years.   Previous treatments include: none  -Shx: Lives in [place] works in [employer] at the [employer]    Review Of Systems  Eyes: negative  Ears/Nose/Throat: negative  Respiratory: No shortness of breath, dyspnea on exertion, cough, or hemoptysis  Cardiovascular: negative  Gastrointestinal: negative  Genitourinary: negative  Musculoskeletal: negative  Neurologic: negative  Psychiatric: negative  Skin: positive for lesion      PHYSICAL EXAM:    /77   Pulse 59   LMP 06/03/2020   SpO2 100%   Skin exam performed as follows: Type 5 skin. Mood appropriate  Alert and Oriented X 3. Well developed, well nourished in no distress.  General appearance: Normal  Head including face: Normal  Eyes: conjunctiva and lids: Normal  Mouth: Lips, teeth, gums: Normal  Neck: Normal  Chest-breast/axillae: Normal  Back: Normal  Spleen and liver: Normal  Cardiovascular: Exam of peripheral vascular system by observation for swelling, varicosities, edema: Normal  Genitalia: groin, buttocks: Normal  Extremities: digits/nails (clubbing): Normal  Eccrine and Apocrine glands: Normal  Right upper extremity: Normal  Left upper extremity: Normal  Right lower extremity: Normal  Left lower extremity: Normal  Skin: Scalp and body hair: See below    1. 10 mm smooth mobile subcutaneous nodule on the right glabella    ASSESSMENT/PLAN:     1. Cyst on the right glabella - growing and is irritated. Discussed excision with Dr Smith if desired. She will think about whether or not she would like the area excised.           Follow-up: PRN  CC:   Scribed By: Mandi Pearce, MS, PA-C

## 2020-06-05 NOTE — LETTER
6/5/2020         RE: Matthias Quarles  [address]  [place] [address]        Dear Colleague,    [facility] Please see a copy of my visit note below.    HPI:   Chief complaints: Matthias Quarles is a 29 year old female who presents for evaluation of a possible lipoma on the forehead/eye area. Area has been present for about 13 years and is growing.   Condition present for:  13 years.   Previous treatments include: none  -Shx: Lives in [place] works in [employer] at the [employer]    Review Of Systems  Eyes: negative  Ears/Nose/Throat: negative  Respiratory: No shortness of breath, dyspnea on exertion, cough, or hemoptysis  Cardiovascular: negative  Gastrointestinal: negative  Genitourinary: negative  Musculoskeletal: negative  Neurologic: negative  Psychiatric: negative  Skin: positive for lesion      PHYSICAL EXAM:    /77   Pulse 59   LMP 06/03/2020   SpO2 100%   Skin exam performed as follows: Type 5 skin. Mood appropriate  Alert and Oriented X 3. Well developed, well nourished in no distress.  General appearance: Normal  Head including face: Normal  Eyes: conjunctiva and lids: Normal  Mouth: Lips, teeth, gums: Normal  Neck: Normal  Chest-breast/axillae: Normal  Back: Normal  Spleen and liver: Normal  Cardiovascular: Exam of peripheral vascular system by observation for swelling, varicosities, edema: Normal  Genitalia: groin, buttocks: Normal  Extremities: digits/nails (clubbing): Normal  Eccrine and Apocrine glands: Normal  Right upper extremity: Normal  Left upper extremity: Normal  Right lower extremity: Normal  Left lower extremity: Normal  Skin: Scalp and body hair: See below    1. 10 mm smooth mobile subcutaneous nodule on the right glabella    ASSESSMENT/PLAN:     1. Cyst on the right glabella - growing and is irritated. Discussed excision with Dr Smith if desired. She will think about whether or not she would like  the area excised.           Follow-up: PRN  CC:   Scribed By: Mandi Pearce, MS, PAJOSH        Again, thank you for allowing me to participate in the care of your patient.        Sincerely,        Mandi Pearce PA-C

## 2020-12-06 ENCOUNTER — HEALTH MAINTENANCE LETTER (OUTPATIENT)
Age: 30
End: 2020-12-06

## 2021-01-15 ENCOUNTER — TELEPHONE (OUTPATIENT)
Dept: DERMATOLOGY | Facility: CLINIC | Age: 31
End: 2021-01-15

## 2021-01-15 NOTE — TELEPHONE ENCOUNTER
Patient called back.    Scheduled cyst procedure- right glabella.    *Consult w/ MM    Patient voiced understanding.    ANNABEL Aguirre-BSN-N  [facility]  [phone]

## 2021-01-15 NOTE — TELEPHONE ENCOUNTER
Called and spoke to patient.    Patient had a consult w/ MM on 6/5/20- cyst on right glabella.    Offered future dates to schedule excision.    Patient will call back to schedule after looking at work schedule.    Patient voiced understanding.    Carla RN-BSN-N  [facility]  [phone]

## 2021-01-15 NOTE — TELEPHONE ENCOUNTER
Pt saw Mandi earlier and she was referred to Dr Smith. She is ok to go to either location. Please offer her a couple of dates. Ok to leave a detailed message.

## 2021-02-04 ENCOUNTER — OFFICE VISIT (OUTPATIENT)
Dept: DERMATOLOGY | Facility: CLINIC | Age: 31
End: 2021-02-04
Payer: COMMERCIAL

## 2021-02-04 VITALS — OXYGEN SATURATION: 100 % | SYSTOLIC BLOOD PRESSURE: 120 MMHG | DIASTOLIC BLOOD PRESSURE: 81 MMHG | HEART RATE: 65 BPM

## 2021-02-04 DIAGNOSIS — D48.5 NEOPLASM OF UNCERTAIN BEHAVIOR OF SKIN: Primary | ICD-10-CM

## 2021-02-04 PROCEDURE — 88304 TISSUE EXAM BY PATHOLOGIST: CPT | Performed by: DERMATOLOGY

## 2021-02-04 PROCEDURE — 11442 EXC FACE-MM B9+MARG 1.1-2 CM: CPT | Mod: 51 | Performed by: DERMATOLOGY

## 2021-02-04 PROCEDURE — 13151 CMPLX RPR E/N/E/L 1.1-2.5 CM: CPT | Performed by: DERMATOLOGY

## 2021-02-04 NOTE — LETTER
2/4/2021         RE: Matthias Quarles  [address]  [facility] [address]        Dear Colleague,    Thank you for referring your patient, Matthias Quarles, to the [facility]. Please see a copy of my visit note below.    Matthias Quarles is an extremely pleasant 30 year old year old female patient here today for evaluation and managment of tender cyst on right glabella.  Patient has no other skin complaints today.  Remainder of the HPI, Meds, PMH, Allergies, FH, and SH was reviewed in chart.      Past Medical History:   Diagnosis Date     ASCUS with positive high risk HPV cervical 1/23/2018 1/23/18 ASCUS, +HR HPV, not 16/18. Plan colp  5/1/18 [place] ECC: neg. Plan 1 year cotest 1/29/2019: Pap: NIL, Neg HR HPV result. Plan provider review.      Benign headache     1-2 x monthly     Chronic leukopenia     benign; has been evaluated by heme in past (12/2012/); no intervention indicated; regular monitoring NOT indicated (only check as clinically indicated)       Past Surgical History:   Procedure Laterality Date     NO HISTORY OF SURGERY          Family History   Problem Relation Age of Onset     Heart Disease Maternal Grandfather         details unknown     Diabetes Type 2  Paternal Grandfather      Cerebrovascular Disease Paternal Grandfather         later in life     Heart Surgery Maternal Uncle         details unknown     Myocardial Infarction No family hx of      Coronary Artery Disease Early Onset No family hx of      Colon Cancer No family hx of      Breast Cancer No family hx of      Ovarian Cancer No family hx of        Social History     Socioeconomic History     Marital status: Single     Spouse name: Not on file     Number of children: Not on file     Years of education: Not on file     Highest education level: Not on file   Occupational History     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     Comment: [employer]   Social Needs     Financial resource strain: Not on file      Food insecurity     Worry: Not on file     Inability: Not on file     Transportation needs     Medical: Not on file     Non-medical: Not on file   Tobacco Use     Smoking status: Never Smoker     Smokeless tobacco: Never Used   Substance and Sexual Activity     Alcohol use: Yes     Frequency: 2-3 times a week     Drinks per session: 1 or 2     Binge frequency: Never     Drug use: No     Sexual activity: Yes     Partners: Male     Birth control/protection: Condom   Lifestyle     Physical activity     Days per week: Not on file     Minutes per session: Not on file     Stress: Not on file   Relationships     Social connections     Talks on phone: Not on file     Gets together: Not on file     Attends Buddhism service: Not on file     Active member of club or organization: Not on file     Attends meetings of clubs or organizations: Not on file     Relationship status: Not on file     Intimate partner violence     Fear of current or ex partner: Not on file     Emotionally abused: Not on file     Physically abused: Not on file     Forced sexual activity: Not on file   Other Topics Concern     Parent/sibling w/ CABG, MI or angioplasty before 65F 55M? Not Asked   Social History Narrative    Single.     No kids.    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        No outpatient encounter medications on file as of 2/4/2021.     No facility-administered encounter medications on file as of 2/4/2021.              Review Of Systems  Skin: As above  Eyes: negative  Ears/Nose/Throat: negative  Respiratory: No shortness of breath, dyspnea on exertion, cough, or hemoptysis  Cardiovascular: negative  Gastrointestinal: negative  Genitourinary: negative  Musculoskeletal: negative  Neurologic: negative  Psychiatric: negative  Hematologic/Lymphatic/Immunologic: negative  Endocrine: negative      O:   NAD, WDWN, Alert & Oriented, Mood & Affect wnl, Vitals stable   Here today alone   /81   Pulse 65   SpO2 100%    General  appearance normal   Vitals stable   Alert, oriented and in no acute distress  Collins IV     R glabella 1.1cm subcutaneous movable nodule      Eyes: Conjunctivae/lids:Normal     ENT: Lips, buccal mucosa, tongue: normal    MSK:Normal    Cardiovascular: peripheral edema none    Pulm: Breathing Normal    Neuro/Psych: Orientation:Alert and Orientedx3 ; Mood/Affect:normal       A/P:  1. Cyst v lipoma v hidrocystoma    It was a pleasure speaking to Matthias Quarles today.  BENIGN LESIONS DISCUSSED WITH PATIENT:  I discussed the specifics of tumor, prognosis, and genetics of benign lesions.  I explained that treatment of these lesions would be purely cosmetic and not medically neccessary.  I discussed with patient different removal options including excision, cautery and /or laser.      SCar and pigmentation of scar discussed with patient     EXCISION OF BENIGN LESION AND COMPLEX: After thorough discussion of PGACAC, consent obtained, anesthesia and prep, the margins of the lesion were identified and an elliptical incision was made encompassing the lesion. The incisions were made through the skin and down to and including the subcutaneous tissue. The lesion was removed en bloc and submitted for perm  pathologic review. The wound edges were widely undermined until adequate tissue mobility was obtained. hemostasis was achieved. The wound edges were then closed in a layered fashion, being careful not to leave any dead space. Postoperative length was 1.7 cm.   EBL minimal; complications none; wound care routine. The patient was discharged in good condition and will return in one week for wound evaluation.        Again, thank you for allowing me to participate in the care of your patient.        Sincerely,        Duane Smith MD

## 2021-02-04 NOTE — PATIENT INSTRUCTIONS
Sutured Wound Care     [facility]: [phone]    [facility]: [phone]          ? No strenuous activity for 48 hours. Resume moderate activity in 48 hours. No heavy exercising until you are seen for follow up in one week.     ? Take Tylenol as needed for discomfort.                         ? Do not drink alcoholic beverages for 48 hours.     ? Keep the pressure bandage in place for 24 hours. If the bandage becomes blood tinged or loose, reinforce it with gauze and tape.        (Refer to the reverse side of this page for management of bleeding).    ? Remove pressure bandage in 24 hours     ? Leave the flat bandage in place until your follow up appointment.    ? Keep the bandage dry. Wash around it carefully.    ? If the tape becomes soiled or starts to come off, reinforce it with additional paper tape.    ? Do not smoke for 3 weeks; smoking is detrimental to wound healing.    ? It is normal to have swelling and bruising around the surgical site. The bruising will fade in approximately 10-14 days. Elevate the area to reduce swelling.    ? Numbness, itchiness and sensitivity to temperature changes can occur after surgery and may take up to 18 months to normalize.      POSSIBLE COMPLICATIONS    BLEEDIN. Leave the bandage in place.  2. Use tightly rolled up gauze or a cloth to apply direct pressure over the bandage for 20   minutes.  3. Reapply pressure for an additional 20 minutes if necessary  4. Call the office or go to the nearest emergency room if pressure fails to stop the bleeding.  5. Use additional gauze and tape to maintain pressure once the bleeding has stopped.        PAIN:    1. Post operative pain should slowly get better, never worse.  2. A severe increase in pain may indicate a problem. Call the office if this occurs.    In case of emergency phone:Dr Smith [phone]

## 2021-02-04 NOTE — PROGRESS NOTES
Matthias Quarles is an extremely pleasant 30 year old year old female patient here today for evaluation and managment of tender cyst on right glabella.  Patient has no other skin complaints today.  Remainder of the HPI, Meds, PMH, Allergies, FH, and SH was reviewed in chart.      Past Medical History:   Diagnosis Date     ASCUS with positive high risk HPV cervical 1/23/2018 1/23/18 ASCUS, +HR HPV, not 16/18. Plan colp  5/1/18 [place] ECC: neg. Plan 1 year cotest 1/29/2019: Pap: NIL, Neg HR HPV result. Plan provider review.      Benign headache     1-2 x monthly     Chronic leukopenia     benign; has been evaluated by heme in past (12/2012/); no intervention indicated; regular monitoring NOT indicated (only check as clinically indicated)       Past Surgical History:   Procedure Laterality Date     NO HISTORY OF SURGERY          Family History   Problem Relation Age of Onset     Heart Disease Maternal Grandfather         details unknown     Diabetes Type 2  Paternal Grandfather      Cerebrovascular Disease Paternal Grandfather         later in life     Heart Surgery Maternal Uncle         details unknown     Myocardial Infarction No family hx of      Coronary Artery Disease Early Onset No family hx of      Colon Cancer No family hx of      Breast Cancer No family hx of      Ovarian Cancer No family hx of        Social History     Socioeconomic History     Marital status: Single     Spouse name: Not on file     Number of children: Not on file     Years of education: Not on file     Highest education level: Not on file   Occupational History     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     Comment: [employer]   Social Needs     Financial resource strain: Not on file     Food insecurity     Worry: Not on file     Inability: Not on file     Transportation needs     Medical: Not on file     Non-medical: Not on file   Tobacco Use     Smoking status: Never Smoker     Smokeless tobacco: Never Used   Substance and Sexual Activity      Alcohol use: Yes     Frequency: 2-3 times a week     Drinks per session: 1 or 2     Binge frequency: Never     Drug use: No     Sexual activity: Yes     Partners: Male     Birth control/protection: Condom   Lifestyle     Physical activity     Days per week: Not on file     Minutes per session: Not on file     Stress: Not on file   Relationships     Social connections     Talks on phone: Not on file     Gets together: Not on file     Attends Bahai service: Not on file     Active member of club or organization: Not on file     Attends meetings of clubs or organizations: Not on file     Relationship status: Not on file     Intimate partner violence     Fear of current or ex partner: Not on file     Emotionally abused: Not on file     Physically abused: Not on file     Forced sexual activity: Not on file   Other Topics Concern     Parent/sibling w/ CABG, MI or angioplasty before 65F 55M? Not Asked   Social History Narrative    Single.     No kids.    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        No outpatient encounter medications on file as of 2/4/2021.     No facility-administered encounter medications on file as of 2/4/2021.              Review Of Systems  Skin: As above  Eyes: negative  Ears/Nose/Throat: negative  Respiratory: No shortness of breath, dyspnea on exertion, cough, or hemoptysis  Cardiovascular: negative  Gastrointestinal: negative  Genitourinary: negative  Musculoskeletal: negative  Neurologic: negative  Psychiatric: negative  Hematologic/Lymphatic/Immunologic: negative  Endocrine: negative      O:   NAD, WDWN, Alert & Oriented, Mood & Affect wnl, Vitals stable   Here today alone   /81   Pulse 65   SpO2 100%    General appearance normal   Vitals stable   Alert, oriented and in no acute distress  Collins IV     R glabella 1.1cm subcutaneous movable nodule      Eyes: Conjunctivae/lids:Normal     ENT: Lips, buccal mucosa, tongue: normal    MSK:Normal    Cardiovascular: peripheral edema  none    Pulm: Breathing Normal    Neuro/Psych: Orientation:Alert and Orientedx3 ; Mood/Affect:normal       A/P:  1. Cyst v lipoma v hidrocystoma    It was a pleasure speaking to Matthias Quarles today.  BENIGN LESIONS DISCUSSED WITH PATIENT:  I discussed the specifics of tumor, prognosis, and genetics of benign lesions.  I explained that treatment of these lesions would be purely cosmetic and not medically neccessary.  I discussed with patient different removal options including excision, cautery and /or laser.      SCar and pigmentation of scar discussed with patient     EXCISION OF BENIGN LESION AND COMPLEX: After thorough discussion of PGACAC, consent obtained, anesthesia and prep, the margins of the lesion were identified and an elliptical incision was made encompassing the lesion. The incisions were made through the skin and down to and including the subcutaneous tissue. The lesion was removed en bloc and submitted for perm  pathologic review. The wound edges were widely undermined until adequate tissue mobility was obtained. hemostasis was achieved. The wound edges were then closed in a layered fashion, being careful not to leave any dead space. Postoperative length was 1.7 cm.   EBL minimal; complications none; wound care routine. The patient was discharged in good condition and will return in one week for wound evaluation.

## 2021-02-09 LAB — COPATH REPORT: NORMAL

## 2021-02-10 ENCOUNTER — ALLIED HEALTH/NURSE VISIT (OUTPATIENT)
Dept: DERMATOLOGY | Facility: CLINIC | Age: 31
End: 2021-02-10
Payer: COMMERCIAL

## 2021-02-10 DIAGNOSIS — Z48.01 ENCOUNTER FOR CHANGE OR REMOVAL OF SURGICAL WOUND DRESSING: Primary | ICD-10-CM

## 2021-02-10 PROCEDURE — 99207 PR NO CHARGE NURSE ONLY: CPT

## 2021-02-10 NOTE — NURSING NOTE
Pt returned to clinic for post surgery 1 week follow up bandage change. Pt has no complaints, denies pain. Bandage removed from right glabella, area cleansed with normal saline. Site is healing and wound edges approximating well. Reapplied new steri strips and paper tape.    Advised to watch for signs/sx of infection; spreading redness, drainage, odor, fever. Call or report promptly to clinic. Pt given written instructions and informed to rtc as needed. Patient verbalized understanding.     ANNABEL Aguirre-BSN-PHN  [facility]  [phone]

## 2021-02-10 NOTE — PATIENT INSTRUCTIONS

## 2021-02-16 ENCOUNTER — MYC MEDICAL ADVICE (OUTPATIENT)
Dept: DERMATOLOGY | Facility: CLINIC | Age: 31
End: 2021-02-16

## 2021-02-17 NOTE — TELEPHONE ENCOUNTER
Patient called back.    Patient had procedure appointment on 2/4/21- vascular anomaly, right glabella (sutured wound care).    Patient wanted to know if she can start open wound care 1 day early due to bandage coming off.    Informed patient starting open wound care on 2/17 vs 2/18 is okay.    Patient voiced understanding.    ANNABEL Aguirre-BSN-PHN  [facility]  [phone]

## 2021-02-17 NOTE — TELEPHONE ENCOUNTER
Called and LM for patient to call back.    Carla RN-BSN-N  [facility]  [phone]

## 2021-07-22 ASSESSMENT — ENCOUNTER SYMPTOMS
NERVOUS/ANXIOUS: 0
COUGH: 0
SHORTNESS OF BREATH: 0
WEAKNESS: 0
DIARRHEA: 0
FREQUENCY: 0
CHILLS: 0
HEARTBURN: 0
PALPITATIONS: 0
CONSTIPATION: 1
JOINT SWELLING: 0
EYE PAIN: 0
ARTHRALGIAS: 0
DYSURIA: 0
HEADACHES: 0
MYALGIAS: 0
ABDOMINAL PAIN: 1
FEVER: 0
HEMATOCHEZIA: 0
SORE THROAT: 0
HEMATURIA: 0
BREAST MASS: 0
NAUSEA: 0
DIZZINESS: 0
PARESTHESIAS: 0

## 2021-07-23 ENCOUNTER — OFFICE VISIT (OUTPATIENT)
Dept: INTERNAL MEDICINE | Facility: CLINIC | Age: 31
End: 2021-07-23
Payer: COMMERCIAL

## 2021-07-23 VITALS
OXYGEN SATURATION: 100 % | BODY MASS INDEX: 20.14 KG/M2 | DIASTOLIC BLOOD PRESSURE: 64 MMHG | HEART RATE: 87 BPM | TEMPERATURE: 98.8 F | WEIGHT: 121 LBS | SYSTOLIC BLOOD PRESSURE: 94 MMHG

## 2021-07-23 DIAGNOSIS — R53.83 OTHER FATIGUE: ICD-10-CM

## 2021-07-23 DIAGNOSIS — R10.84 ABDOMINAL PAIN, GENERALIZED: ICD-10-CM

## 2021-07-23 DIAGNOSIS — R11.0 NAUSEA: ICD-10-CM

## 2021-07-23 DIAGNOSIS — Z00.00 ROUTINE GENERAL MEDICAL EXAMINATION AT A HEALTH CARE FACILITY: Primary | ICD-10-CM

## 2021-07-23 DIAGNOSIS — R14.0 ABDOMINAL BLOATING: ICD-10-CM

## 2021-07-23 LAB
ALBUMIN SERPL-MCNC: 4.5 G/DL (ref 3.4–5)
ALP SERPL-CCNC: 55 U/L (ref 40–150)
ALT SERPL W P-5'-P-CCNC: 22 U/L (ref 0–50)
ANION GAP SERPL CALCULATED.3IONS-SCNC: 5 MMOL/L (ref 3–14)
AST SERPL W P-5'-P-CCNC: 11 U/L (ref 0–45)
BILIRUB SERPL-MCNC: 0.9 MG/DL (ref 0.2–1.3)
BUN SERPL-MCNC: 9 MG/DL (ref 7–30)
CALCIUM SERPL-MCNC: 9.6 MG/DL (ref 8.5–10.1)
CHLORIDE BLD-SCNC: 105 MMOL/L (ref 94–109)
CO2 SERPL-SCNC: 26 MMOL/L (ref 20–32)
CREAT SERPL-MCNC: 0.71 MG/DL (ref 0.52–1.04)
ERYTHROCYTE [DISTWIDTH] IN BLOOD BY AUTOMATED COUNT: 12 % (ref 10–15)
FERRITIN SERPL-MCNC: 35 NG/ML (ref 12–150)
GFR SERPL CREATININE-BSD FRML MDRD: >90 ML/MIN/1.73M2
GLUCOSE BLD-MCNC: 96 MG/DL (ref 70–99)
HCT VFR BLD AUTO: 44.6 % (ref 35–47)
HGB BLD-MCNC: 15.2 G/DL (ref 11.7–15.7)
IRON SATN MFR SERPL: 27 % (ref 15–46)
IRON SERPL-MCNC: 93 UG/DL (ref 35–180)
MCH RBC QN AUTO: 30 PG (ref 26.5–33)
MCHC RBC AUTO-ENTMCNC: 34.1 G/DL (ref 31.5–36.5)
MCV RBC AUTO: 88 FL (ref 78–100)
PLATELET # BLD AUTO: 180 10E3/UL (ref 150–450)
POTASSIUM BLD-SCNC: 3.6 MMOL/L (ref 3.4–5.3)
PROT SERPL-MCNC: 8.2 G/DL (ref 6.8–8.8)
RBC # BLD AUTO: 5.07 10E6/UL (ref 3.8–5.2)
SODIUM SERPL-SCNC: 136 MMOL/L (ref 133–144)
TIBC SERPL-MCNC: 339 UG/DL (ref 240–430)
TSH SERPL DL<=0.005 MIU/L-ACNC: 0.93 MU/L (ref 0.4–4)
WBC # BLD AUTO: 3.6 10E3/UL (ref 4–11)

## 2021-07-23 PROCEDURE — 99395 PREV VISIT EST AGE 18-39: CPT | Performed by: INTERNAL MEDICINE

## 2021-07-23 PROCEDURE — 82784 ASSAY IGA/IGD/IGG/IGM EACH: CPT | Performed by: INTERNAL MEDICINE

## 2021-07-23 PROCEDURE — 82728 ASSAY OF FERRITIN: CPT | Performed by: INTERNAL MEDICINE

## 2021-07-23 PROCEDURE — 36415 COLL VENOUS BLD VENIPUNCTURE: CPT | Performed by: INTERNAL MEDICINE

## 2021-07-23 PROCEDURE — 80053 COMPREHEN METABOLIC PANEL: CPT | Performed by: INTERNAL MEDICINE

## 2021-07-23 PROCEDURE — 83516 IMMUNOASSAY NONANTIBODY: CPT | Performed by: INTERNAL MEDICINE

## 2021-07-23 PROCEDURE — 84443 ASSAY THYROID STIM HORMONE: CPT | Performed by: INTERNAL MEDICINE

## 2021-07-23 PROCEDURE — 83550 IRON BINDING TEST: CPT | Performed by: INTERNAL MEDICINE

## 2021-07-23 PROCEDURE — 85027 COMPLETE CBC AUTOMATED: CPT | Performed by: INTERNAL MEDICINE

## 2021-07-23 PROCEDURE — 99214 OFFICE O/P EST MOD 30 MIN: CPT | Mod: 25 | Performed by: INTERNAL MEDICINE

## 2021-07-23 NOTE — PROGRESS NOTES
SUBJECTIVE:   CC: Matthias Quarles is an 30 year old woman who presents for preventive health visit.     Patient has been advised of split billing requirements and indicates understanding: Yes     Healthy Habits:  Answers for HPI/ROS submitted by the patient on 7/22/2021  Frequency of exercise:: 2-3 days/week  Getting at least 3 servings of Calcium per day:: Yes  Diet:: Other  Bi-annual eye exam:: Yes  Dental care twice a year:: NO  Sleep apnea or symptoms of sleep apnea:: Daytime drowsiness  Additional concerns today:: Yes  Duration of exercise:: 30-45 minutes    Matthias presents today for a physical exam. We also discussed her abdominal pain/bloating. Ongoing for about a year. Tried probiotics without help. Alternates between diarrhea and constipation. Lactose, gluten, and now raw vegetables all trigger it. No vomiting. Some nausea. No blood in stool. No rashes. No f/c. Would like lab work-up for this. She is also fatigued all the time which she feels may be related to her stomach symptoms.    Today's PHQ-2 Score:   PHQ-2 ( 1999 [employer]) 7/22/2021 5/26/2020   Q1: Little interest or pleasure in doing things 0 0   Q2: Feeling down, depressed or hopeless 0 0   PHQ-2 Score 0 0   Q1: Little interest or pleasure in doing things Not at all -   Q2: Feeling down, depressed or hopeless Not at all -   PHQ-2 Score 0 -     Abuse: Current or Past(Physical, Sexual or Emotional)- No  Do you feel safe in your environment? Yes    Have you ever done Advance Care Planning? (For example, a Health Directive, POLST, or a discussion with a medical provider or your loved ones about your wishes): No, advance care planning information given to patient to review.  Patient declined advance care planning discussion at this time.    Social History     Tobacco Use     Smoking status: Never Smoker     Smokeless tobacco: Never Used   Substance Use Topics     Alcohol use: Yes     If you drink alcohol do you typically have >3 drinks per day or >7  drinks per week? No                     Reviewed orders with patient.  Reviewed health maintenance and updated orders accordingly - Yes    Labs reviewed in EPIC    FHS-7: No flowsheet data found.    Patient under 40 years of age: Routine Mammogram Screening not recommended.   Pertinent mammograms are reviewed under the imaging tab.    Pertinent mammograms are reviewed under the imaging tab.  History of abnormal Pap smear: NO - age 21-29 PAP every 3 years recommended  PAP / HPV Latest Ref Rng & Units 1/29/2019 1/23/2018 10/6/2014   PAP (Historical) - NIL ASC-US(A) NIL   HPV16 NEG:Negative Negative Negative -   HPV18 NEG:Negative Negative Negative -   HRHPV NEG:Negative Negative Positive(A) -     Reviewed and updated as needed this visit by clinical staff  Tobacco  Allergies  Meds  Problems  Med Hx  Surg Hx  Fam Hx  Soc Hx        Reviewed and updated as needed this visit by Provider  Tobacco  Allergies  Meds  Problems  Med Hx  Surg Hx  Fam Hx           ROS:  CONSTITUTIONAL: NEGATIVE for fever, chills, change in weight  INTEGUMENTARU/SKIN: NEGATIVE for worrisome rashes, moles or lesions  EYES: NEGATIVE for vision changes or irritation  ENT: NEGATIVE for ear, mouth and throat problems  RESP: NEGATIVE for significant cough or SOB  BREAST: NEGATIVE for masses, tenderness or discharge  CV: NEGATIVE for chest pain, palpitations or peripheral edema  GI: POSITIVE for nausea, abdominal pain, heartburn, or change in bowel habits  : NEGATIVE for unusual urinary or vaginal symptoms. Periods are regular.  MUSCULOSKELETAL: NEGATIVE for significant arthralgias or myalgia  NEURO: NEGATIVE for weakness, dizziness or paresthesias  PSYCHIATRIC: NEGATIVE for changes in mood or affect    OBJECTIVE:   BP 94/64   Pulse 87   Temp 98.8  F (37.1  C) (Tympanic)   Wt 54.9 kg (121 lb)   LMP 07/03/2021 (Approximate)   SpO2 100%   Breastfeeding No   BMI 20.14 kg/m       EXAM:  GENERAL: Alert and in no distress.  EYES:  "Conjunctivae/corneas clear. EOMs grossly intact  HENT: NC/AT, facies symmetric.  RESP: CTAB. No w/r/r.  CV: RRR, no m/r/g.  GI: NT, ND, without rebound or guarding, no CVA tenderness  MSK: Moves all four extremities freely  SKIN: No significant ulcers, lesions or rashes on the visualized portions of the skin  NEURO: Alert. Oriented. Gait normal.  PSYCH: Linear thought process. Speech normal rate and volume. No tangential thoughts, hallucinations, or delusions.    Diagnostic Test Results: Labs reviewed in [facility]    ASSESSMENT/PLAN:   1. Routine general medical examination at a health care facility  Reviewed PMH. Discussed healthcare maintenance issues, including cancer screenings (Pap smears), relevant immunizations, and cardiac risk factor screenings such as for cholesterol, HTN, and DM.  - REVIEW OF HEALTH MAINTENANCE PROTOCOL ORDERS    2. Abdominal bloating  3. Abdominal pain, generalized  4. Nausea  DDX wide and includes food sensitivities, IBS, Celiac, H pylori, PUD, other. If below work-up negative, will plan for GI referral at that time.  - CBC with platelets  - Comprehensive metabolic panel  - Helicobacter pylori Antigen Stool  - IgA [LAB73]  - Tissue transglutaminase sade IgA and IgG [[id number]]    5. Other fatigue  DDX includes bowel symptoms/pathology as above, but also iron deficiency, hypothyroidism, etc. Will initiate work-up above and below. Continue to monitor.  - Ferritin  - Iron & Iron Binding Capacity  - TSH with free T4 reflex    Patient has been advised of split billing requirements and indicates understanding: Yes     COUNSELING:   Special attention given to:        Regular exercise       Healthy diet/nutrition       Immunizations    Declined: TDAP due to timing (preferred to get it later in the summer)    Estimated body mass index is 20.14 kg/m  as calculated from the following:    Height as of 9/4/19: 1.651 m (5' 5\").    Weight as of this encounter: 54.9 kg (121 lb).    She reports that she has " never smoked. She has never used smokeless tobacco.    Steven Ortiz MD  [facility]

## 2021-07-23 NOTE — PATIENT INSTRUCTIONS
- I will send you a message on [facility] when I am able to look at the results of your tests from today    Preventive Health Recommendations  Female Ages 26 - 39  Yearly exam:   See your health care provider every year in order to    Review health changes.     Discuss preventive care.      Review your medicines if you your doctor has prescribed any.    Until age 30: Get a Pap test every three years (more often if you have had an abnormal result).    After age 30: Talk to your doctor about whether you should have a Pap test every 3 years or have a Pap test with HPV screening every 5 years.   You do not need a Pap test if your uterus was removed (hysterectomy) and you have not had cancer.  You should be tested each year for STDs (sexually transmitted diseases), if you're at risk.   Talk to your provider about how often to have your cholesterol checked.  If you are at risk for diabetes, you should have a diabetes test (fasting glucose).  Shots: Get a flu shot each year. Get a tetanus shot every 10 years.   Nutrition:     Eat at least 5 servings of fruits and vegetables each day.    Eat whole-grain bread, whole-wheat pasta and brown rice instead of white grains and rice.    Get adequate Calcium and Vitamin D.     Lifestyle    Exercise at least 150 minutes a week (30 minutes a day, 5 days of the week). This will help you control your weight and prevent disease.    Limit alcohol to one drink per day.    No smoking.     Wear sunscreen to prevent skin cancer.    See your dentist every six months for an exam and cleaning.

## 2021-07-26 DIAGNOSIS — R10.84 ABDOMINAL PAIN, GENERALIZED: Primary | ICD-10-CM

## 2021-07-26 LAB
IGA SERPL-MCNC: 97 MG/DL (ref 84–499)
TTG IGA SER-ACNC: <0.2 U/ML
TTG IGG SER-ACNC: 1 U/ML

## 2021-08-25 NOTE — PROGRESS NOTES
"Nurse Note      Itinerary:  [facility]       Departure Date: 09/08/2021      Return Date: 10/08/2021      Length of Trip 1 month      Reason for Travel: Visiting friends and relatives           Urban or rural: urban      Accommodations: Family home        IMMUNIZATION HISTORY  Have you received any immunizations within the past 4 weeks?  Yes  Have you ever fainted from having your blood drawn or from an injection?  No  Have you ever had a fever reaction to vaccination?  No  Have you ever had any bad reaction or side effect from any vaccination?  No  Have you ever had hepatitis A or B vaccine?  Yes  Do you live (or work closely) with anyone who has AIDS, an AIDS-like condition, any other immune disorder or who is on chemotherapy for cancer?  No  Do you have a family history of immunodeficiency?  No  Have you received any injection of immune globulin or any blood products during the past 12 months?  No    Patient roomed by DEION Rizzo JOHNNY Quarles is a 31 year old female seen today with sibling for counsultation for international travel.   Patient will be departing in  2 week(s) and  traveling with sibling.      Patient itinerary :  will be in the urban region of [facility] which risk for Malaria, Yellow Fever, food borne illnesses, motor vehicle accidents and Typhoid. exposure.      Patient's activities will include visiting friends and relatives.    Patient's country of birth is [place]    Special medical concerns: none  Pre-travel questionnaire was completed by patient and reviewed by provider.     Vitals: /88   Pulse 82   Temp 98  F (36.7  C)   Ht 1.651 m (5' 5\")   Wt 55.6 kg (122 lb 8 oz)   SpO2 99%   BMI 20.39 kg/m    BMI= Body mass index is 20.39 kg/m .    EXAM:  General:  Well-nourished, well-developed in no acute distress.  Appears to be stated age, interacts appropriately and expresses understanding of information given to patient.    Current Outpatient Medications   Medication Sig " Dispense Refill     atovaquone-proguanil (MALARONE) 250-100 MG tablet Take 1 tablet by mouth daily Start 2 days before exposure to Malaria and continue daily till  7 days after exposure. 40 tablet 0     azithromycin (ZITHROMAX) 500 MG tablet Take 1 tablet (500 mg) by mouth daily for 3 doses Take 1 tablet a day for up to 3 days for severe diarrhea 3 tablet 0     Patient Active Problem List   Diagnosis     Chronic leukopenia     ASCUS with positive high risk HPV cervical     Allergies   Allergen Reactions     Amoxicillin Hives and Itching         Immunizations discussed include:   Covid 19: Up to date 2nd shot pending  Hepatitis A:  Declined  2nd dose due to personal prioritization   Hepatitis B: Up to date  Influenza: vaccine is not available  Typhoid: Ordered/given today, risks, benefits and side effects reviewed  Rabies: Declined  reviewed managment of a animal bite or scratch (washing wound, seek medical care within 24 hours for post exposure prophylaxis )  Yellow Fever: Up to date  Japanese Encephalitis: Not indicated  Meningococcus: Ordered/given today, risks, benefits and side effects reviewed  Tetanus/Diphtheria: Ordered/given today, risks, benefits and side effects reviewed  Measles/Mumps/Rubella: Up to date  Cholera: Not needed  Polio: Declined  Not concerned about risk of disease  Pneumococcal: Under age of 65  Varicella: Up to date  Shingrix: Not indicated  HPV:  Up to date     TB: low risk     ASSESSMENT/PLAN:  Diagnoses and all orders for this visit:    Travel advice encounter  -     atovaquone-proguanil (MALARONE) 250-100 MG tablet; Take 1 tablet by mouth daily Start 2 days before exposure to Malaria and continue daily till  7 days after exposure.  -     azithromycin (ZITHROMAX) 500 MG tablet; Take 1 tablet (500 mg) by mouth daily for 3 doses Take 1 tablet a day for up to 3 days for severe diarrhea    Other orders  -     TYPHOID VACCINE, IM  -     TDAP VACCINE (Adacel, Boostrix)  [[id number]]  -      MENINGOCOCCAL (MENACTRA )      I have reviewed general recommendations for safe travel   including: food/water precautions, insect precautions, safer sex   practices given high prevalence of Zika, HIV and other STDs,   roadway safety. Educational materials and Travax report provided.    Malaraia prophylaxis recommended: Malarone  Symptomatic treatment for traveler's diarrhea: azithromycin  [place] illness prevention and treatment: none    Personal protective measures reviewed including hand sanitizing and contact precautions for the prevention of viral illnesses. Cover coughs and masking  during travel and upon return.  Current COVID 19 pandemic.   Monitor / follow current CDC guidelines.    Country specific and CDC Covid 19  testing requirements and resources given to patient.    Evacuation insurance advised and resources were provided to patient.    Total visit time 30 minutes  with over 50% of time spent counseling patient as detailed above.    Polly Lira CNP

## 2021-08-26 ENCOUNTER — OFFICE VISIT (OUTPATIENT)
Dept: FAMILY MEDICINE | Facility: CLINIC | Age: 31
End: 2021-08-26
Payer: COMMERCIAL

## 2021-08-26 VITALS
OXYGEN SATURATION: 99 % | BODY MASS INDEX: 20.41 KG/M2 | HEIGHT: 65 IN | TEMPERATURE: 98 F | WEIGHT: 122.5 LBS | SYSTOLIC BLOOD PRESSURE: 125 MMHG | DIASTOLIC BLOOD PRESSURE: 88 MMHG | HEART RATE: 82 BPM

## 2021-08-26 DIAGNOSIS — Z71.84 TRAVEL ADVICE ENCOUNTER: Primary | ICD-10-CM

## 2021-08-26 PROCEDURE — 90471 IMMUNIZATION ADMIN: CPT | Performed by: NURSE PRACTITIONER

## 2021-08-26 PROCEDURE — 90715 TDAP VACCINE 7 YRS/> IM: CPT | Performed by: NURSE PRACTITIONER

## 2021-08-26 PROCEDURE — 90691 TYPHOID VACCINE IM: CPT | Performed by: NURSE PRACTITIONER

## 2021-08-26 PROCEDURE — 90734 MENACWYD/MENACWYCRM VACC IM: CPT | Performed by: NURSE PRACTITIONER

## 2021-08-26 PROCEDURE — 99402 PREV MED CNSL INDIV APPRX 30: CPT | Mod: 25 | Performed by: NURSE PRACTITIONER

## 2021-08-26 PROCEDURE — 90472 IMMUNIZATION ADMIN EACH ADD: CPT | Performed by: NURSE PRACTITIONER

## 2021-08-26 RX ORDER — AZITHROMYCIN 500 MG/1
500 TABLET, FILM COATED ORAL DAILY
Qty: 3 TABLET | Refills: 0 | Status: SHIPPED | OUTPATIENT
Start: 2021-08-26 | End: 2021-08-29

## 2021-08-26 RX ORDER — ATOVAQUONE AND PROGUANIL HYDROCHLORIDE 250; 100 MG/1; MG/1
1 TABLET, FILM COATED ORAL DAILY
Qty: 40 TABLET | Refills: 0 | Status: SHIPPED | OUTPATIENT
Start: 2021-08-26 | End: 2022-03-23

## 2021-08-26 ASSESSMENT — MIFFLIN-ST. JEOR: SCORE: 1281.54

## 2021-08-26 NOTE — PATIENT INSTRUCTIONS
Thank you for visiting the [facility] : [phone]  Today August 26, 2021 you received the    Tetanus (Tdap) Vaccine    Typhoid - injectable. This vaccine is valid for two years.     MENINGITIS    Follow up vaccine appointments can be made as a NURSE ONLY visit at the Travel Clinic, (BE PREPARED TO WAIT, ) or at designated [place] [facility].    If you are receiving the Rabies vaccines series, it is important that you follow the exact schedule ordered.     Pre-travel     We recommend that you purchase Trip Evacuation Insurance prior to your departure.  https://wwwnc.cdc.gov/travel/page/insurance    Post-travel  contact your provider if you develop a fever, rash, cough, diarrhea or other symptoms.  Inform your healthcare provider when and where you traveled to.    Animal Exposure: Avoid all mammals even if they look healthy.  If there is a bite, scratch or even a lick, wash area immediately with soap and water for 15 minutes and seek medical care within 24 hours for evaluation of Rabies post exposure treatment.  Contact your Medical Evacuation Insurance.    COVID 19 (Sars Cov2) prevention strategies  Physical distancing: Maintain 6 foot (2m) from others.              Avoid large gatherings and public transportation.   Avoid indoor shopping malls, theaters and restaurants   Practice consistent mask wearing covering the nose, mouth and underneath the chin when unable to maintain 6 foot distance from others.  Hand washing: frequent, thorough handwashing with soap and water for 20 seconds (or using a hand  containing 60% alcohol)   Avoid touching face, nose, eyes, mouth unless you have done appropriate hand washing as above.   Clean high touch surfaces with approved disinfectant against Covid 19  (70% Ethanol ) or a bleach solution (add 20 mL (4 teaspoons) of bleach to 1 L (1 quart) of water;)  Be careful not to breath or touch bleach.      Travel Covid 19 Testing:   updated 05/01/2021  International travelers: Pre-travel: diagnostic testing (antigen or PCR) as required for each country for entry:  See the Embassy websites or airline websites.    [place] Requirements: All air passengers coming to the [place], including [place] citizens, are required to have a negative COVID-19 test result (within 3 calendar days) even if vaccinated or documentation of recovery from COVID-19 before they board a flight to the [place].    Post travel: CDC recommends getting tested 3-5 days after your trip AND stay home and self-quarantine for 7 days. Even if you test negative, stay home and self-quarantine for the full 7 days. If you don t get tested, stay home and self-quarantine for 10 days.    COVID-19 testing for pre-travel through  [facility]  [phone] (Must have an order)    Please call the [email or web address] with any questions [phone]  Or send your provider a 'My Chart' note.       https://et.usembassy.gov/covid-19-information/

## 2021-09-25 ENCOUNTER — HEALTH MAINTENANCE LETTER (OUTPATIENT)
Age: 31
End: 2021-09-25

## 2022-03-22 NOTE — PROGRESS NOTES
Matthias is a 31 year old  female who presents for annual exam.     Besides routine health maintenance,  she would like to discuss her menses. She states that she stopped OCPs about 2 years ago. States that within the last year her periods have been painful and clotting.    HPI:  The patient's PCP is Paul Hurst MD.  New patient to me here today for her annual GYN exam.  She is also due for her Pap smear.  She is a  0 para 0 interested in trying to conceive in the next couple of years.  She has had a long history of painful menstrual cycles and was initially started on oral contraceptive tablets as a teen.  She stopped these pills about 2 years ago and had normal cycles for the first year however they have become more irregular and painful.  She has no pain outside of her menstrual cycle.  She denies any pain with bowel or bladder function.  She does have chronic constipation.  She does have pelvic pain with intercourse but not to the point of avoidance.      GYNECOLOGIC HISTORY:    Patient's last menstrual period was 2022 (exact date).    Regular menses? No  Every 3-5 weeks  Lasting 3 days long    Her current contraception method is: none.  She  reports that she has never smoked. She has never used smokeless tobacco.    Patient is sexually active.  STD testing offered?  Declined  Last PHQ-9 score on record =   PHQ-9 SCORE 9/15/2014   PHQ-9 Total Score 9     Last GAD7 score on record =   ANTONIETA-7 SCORE 9/15/2014   Total Score 2     Alcohol Score = 2    HEALTH MAINTENANCE:  Cholesterol:   Recent Labs   Lab Test 19  0952   CHOL 214*   HDL 59   *   TRIG 68   Last Mammo: Not applicable, Result: Not applicable, Next Mammo: Due at age 42  Pap:   Lab Results   Component Value Date    PAP NIL, HPV- 2019    PAP ASC-US 2018    PAP NIL 10/06/2014     Colonoscopy:  N/a, Result: Not applicable, Next Colonoscopy: Age 52.  Dexa:  N/a    Health maintenance updated:  yes    HISTORY:  OB  "History    Para Term  AB Living   0 0 0 0 0 0   SAB IAB Ectopic Multiple Live Births   0 0 0 0 0       Patient Active Problem List   Diagnosis     Chronic leukopenia     ASCUS with positive high risk HPV cervical     Past Surgical History:   Procedure Laterality Date     NO HISTORY OF SURGERY        Social History     Tobacco Use     Smoking status: Never Smoker     Smokeless tobacco: Never Used   Substance Use Topics     Alcohol use: Yes      Problem (# of Occurrences) Relation (Name,Age of Onset)    Cerebrovascular Disease (1) Paternal Grandfather: later in life    Diabetes Type 2  (1) Paternal Grandfather    Heart Disease (1) Maternal Grandfather: details unknown    Heart Surgery (1) Maternal Uncle: details unknown       Negative family history of: Myocardial Infarction, Coronary Artery Disease Early Onset, Colon Cancer, Breast Cancer, Ovarian Cancer            No current outpatient medications on file.     No current facility-administered medications for this visit.     Allergies   Allergen Reactions     Amoxicillin Hives and Itching       Past medical, surgical, social and family histories were reviewed and updated in EPIC.    ROS:   12 point review of systems negative other than symptoms noted below or in the HPI.  Genitourinary: Irregular Menses and Painful [place]  No urinary frequency or dysuria, bladder or kidney problems, POSITIVE for:, painful menses, irregular menses, heavy periods    EXAM:  BP 98/64   Pulse 84   Ht 1.651 m (5' 5\")   Wt 58.9 kg (129 lb 12.8 oz)   LMP 2022 (Exact Date)   Breastfeeding No   BMI 21.60 kg/m     BMI: Body mass index is 21.6 kg/m .    PHYSICAL EXAM:  Constitutional:   Appearance: Well nourished, well developed, alert, in no acute distress  Neck:  Lymph Nodes:  No lymphadenopathy present    Thyroid:  Gland size normal, nontender, no nodules or masses present  on palpation  Chest:  Respiratory Effort:  Breathing unlabored  Cardiovascular:    Heart: " Auscultation:  Regular rate, normal rhythm, no murmurs present  Breasts: Inspection of Breasts:  No lymphadenopathy present., Palpation of Breasts and Axillae:  No masses present on palpation, no breast tenderness., Axillary Lymph Nodes:  No lymphadenopathy present. and No nodularity, asymmetry or nipple discharge bilaterally.  Gastrointestinal:   Abdominal Examination:  Abdomen nontender to palpation, tone normal without rigidity or guarding, no masses present, umbilicus without lesions   Liver and Spleen:  No hepatomegaly present, liver nontender to palpation    Hernias:  No hernias present  Lymphatic: Lymph Nodes:  No other lymphadenopathy present  Skin:  General Inspection:  No rashes present, no lesions present, no areas of  discoloration  Neurologic:    Mental Status:  Oriented X3.  Normal strength and tone, sensory exam                grossly normal, mentation intact and speech normal.    Psychiatric:   Mentation appears normal and affect normal/bright.         Pelvic Exam:  External Genitalia:     Normal appearance for age, no discharge present, no tenderness present, no inflammatory lesions present, color normal  Vagina:     Normal vaginal vault without central or paravaginal defects, no discharge present, no inflammatory lesions present, no masses present  Bladder:     Nontender to palpation  Urethra:   Urethral Body:  Urethra palpation normal, urethra structural support normal   Urethral Meatus:  No erythema or lesions present  Cervix:     Appearance healthy, no lesions present, nontender to palpation, no bleeding present.  Friable  Uterus:     Uterus: firm, normal sized and nontender, retroverted in position.   Adnexa:     No adnexal tenderness present, no adnexal masses present  Perineum:     Perineum within normal limits, no evidence of trauma, no rashes or skin lesions present  Anus:     Anus within normal limits, no hemorrhoids present  Inguinal Lymph Nodes:     No lymphadenopathy present  Pubic  Hair:     Normal pubic hair distribution for age  Genitalia and Groin:     No rashes present, no lesions present, no areas of discoloration, no masses present      COUNSELING:   Special attention given to:        Regular exercise       Healthy diet/nutrition       Contraception       Family planning    BMI: Body mass index is 21.6 kg/m .      ASSESSMENT:  31 year old female with satisfactory annual exam.    ICD-10-CM    1. Encounter for gynecological examination without abnormal finding  Z01.419 Pap thin layer screen reflex to HPV if ASCUS - recommended age 25 - 29 years   2. Irregular menses  N92.6 HCG quantitative pregnancy     US Transvaginal Pelvic Non-OB     TSH with free T4 reflex       PLAN:  Thin 31-year-old female with a normal exam today.  Due to her irregular menstrual cycles off contraception we will do some blood work and have invited her back for transvaginal ultrasound.  Pap smear was collected and if it is normal she can repeat in 3 years.    ARIANNE Peterson CNP

## 2022-03-23 ENCOUNTER — OFFICE VISIT (OUTPATIENT)
Dept: OBGYN | Facility: CLINIC | Age: 32
End: 2022-03-23
Payer: COMMERCIAL

## 2022-03-23 VITALS
DIASTOLIC BLOOD PRESSURE: 64 MMHG | HEIGHT: 65 IN | BODY MASS INDEX: 21.63 KG/M2 | SYSTOLIC BLOOD PRESSURE: 98 MMHG | WEIGHT: 129.8 LBS | HEART RATE: 84 BPM

## 2022-03-23 DIAGNOSIS — N92.6 IRREGULAR MENSES: ICD-10-CM

## 2022-03-23 DIAGNOSIS — Z01.419 ENCOUNTER FOR GYNECOLOGICAL EXAMINATION WITHOUT ABNORMAL FINDING: Primary | ICD-10-CM

## 2022-03-23 PROCEDURE — 84443 ASSAY THYROID STIM HORMONE: CPT | Performed by: NURSE PRACTITIONER

## 2022-03-23 PROCEDURE — 36415 COLL VENOUS BLD VENIPUNCTURE: CPT | Performed by: NURSE PRACTITIONER

## 2022-03-23 PROCEDURE — 87624 HPV HI-RISK TYP POOLED RSLT: CPT | Performed by: NURSE PRACTITIONER

## 2022-03-23 PROCEDURE — G0124 SCREEN C/V THIN LAYER BY MD: HCPCS | Performed by: PATHOLOGY

## 2022-03-23 PROCEDURE — 99395 PREV VISIT EST AGE 18-39: CPT | Performed by: NURSE PRACTITIONER

## 2022-03-23 PROCEDURE — 84702 CHORIONIC GONADOTROPIN TEST: CPT | Performed by: NURSE PRACTITIONER

## 2022-03-23 PROCEDURE — G0145 SCR C/V CYTO,THINLAYER,RESCR: HCPCS | Performed by: NURSE PRACTITIONER

## 2022-03-23 PROCEDURE — 99213 OFFICE O/P EST LOW 20 MIN: CPT | Mod: 25 | Performed by: NURSE PRACTITIONER

## 2022-03-24 LAB
B-HCG SERPL-ACNC: <1 IU/L (ref 0–5)
TSH SERPL DL<=0.005 MIU/L-ACNC: 1.42 MU/L (ref 0.4–4)

## 2022-03-28 LAB
BKR LAB AP GYN ADEQUACY: ABNORMAL
BKR LAB AP GYN INTERPRETATION: ABNORMAL
BKR LAB AP HPV REFLEX: ABNORMAL
BKR LAB AP LMP: ABNORMAL
BKR LAB AP PREVIOUS ABNL DX: ABNORMAL
BKR LAB AP PREVIOUS ABNORMAL: ABNORMAL
PATH REPORT.COMMENTS IMP SPEC: ABNORMAL
PATH REPORT.COMMENTS IMP SPEC: ABNORMAL
PATH REPORT.RELEVANT HX SPEC: ABNORMAL

## 2022-03-29 LAB
HUMAN PAPILLOMA VIRUS 16 DNA: NEGATIVE
HUMAN PAPILLOMA VIRUS 18 DNA: NEGATIVE
HUMAN PAPILLOMA VIRUS FINAL DIAGNOSIS: ABNORMAL
HUMAN PAPILLOMA VIRUS OTHER HR: POSITIVE

## 2022-03-30 ENCOUNTER — PATIENT OUTREACH (OUTPATIENT)
Dept: OBGYN | Facility: CLINIC | Age: 32
End: 2022-03-30
Payer: COMMERCIAL

## 2022-03-30 DIAGNOSIS — R87.610 ASCUS WITH POSITIVE HIGH RISK HPV CERVICAL: ICD-10-CM

## 2022-03-30 DIAGNOSIS — R87.810 ASCUS WITH POSITIVE HIGH RISK HPV CERVICAL: ICD-10-CM

## 2022-05-10 NOTE — PROGRESS NOTES
Nurse Note      Itinerary:  [facility]      Departure Date: 6/29/2022      Return Date: 7-15-22      Length of Trip 2 weeks      Reason for Travel: Tourism           Urban or rural: both      Accommodations: Hotel    Family home    [employer] (tent)        IMMUNIZATION HISTORY  Have you received any immunizations within the past 4 weeks?  No  Have you ever fainted from having your blood drawn or from an injection?  No  Have you ever had a fever reaction to vaccination?  No  Have you ever had any bad reaction or side effect from any vaccination?  No  Have you ever had hepatitis A or B vaccine?  Yes  Do you live (or work closely) with anyone who has AIDS, an AIDS-like condition, any other immune disorder or who is on chemotherapy for cancer?  No  Do you have a family history of immunodeficiency?  No  Have you received any injection of immune globulin or any blood products during the past 12 months?  No    Patient roomed by     Dylan TURNER Willam is a 31 year old female seen today with partner for counsultation for international travel.   Patient will be departing in  1 month(s) and  traveling with partner.      Patient itinerary :  will be in the urban and rural  region of [facility] with a possible trip to [facility] which risk for Malaria and food borne illnesses. exposure.      Patient's activities will include visiting friends and relatives.    Patient[place] country [place] birth is [place]    Special medical concerns: none  Pre-travel questionnaire was completed by patient and reviewed by provider.     Vitals: /85   Temp 97.4  F (36.3  C) (Oral)   Wt 60 kg (132 lb 3.2 oz)   LMP 05/12/2022 (Approximate)   BMI 22.00 kg/m    BMI= Body mass index is 22 kg/m .    EXAM:  General:  Well-nourished, well-developed in no acute distress.  Appears to be stated age, interacts appropriately and expresses understanding of information given to patient.    Current Outpatient Medications   Medication Sig Dispense Refill      atovaquone-proguanil (MALARONE) 250-100 MG tablet Take 1 tablet by mouth daily Start 2 days before exposure to Malaria and continue daily till  7 days after exposure. 40 tablet 0     azithromycin (ZITHROMAX) 500 MG tablet Take 1 tablet (500 mg) by mouth daily for 3 doses Take 1 tablet a day for up to 3 days for severe diarrhea 3 tablet 0     Patient Active Problem List   Diagnosis     Chronic leukopenia     ASCUS with positive high risk HPV cervical     Allergies   Allergen Reactions     Amoxicillin Hives and Itching         Immunizations discussed include:   Covid 19: Up to date  Hepatitis A:  Ordered/given today, risks, benefits and side effects reviewed  Hepatitis B: Up to date  Influenza: Up to date  Typhoid: Ordered/given today, risks, benefits and side effects reviewed  Rabies: Declined  reviewed managment of a animal bite or scratch (washing wound, seek medical care within 24 hours for post exposure prophylaxis )  Yellow Fever: Up to date  Japanese Encephalitis: Not indicated  Meningococcus: Not indicated  Tetanus/Diphtheria: Up to date  Measles/Mumps/Rubella: Up to date  Cholera: Not needed  Polio: Up to date  Pneumococcal: Under age of 65  Varicella: Up to date  Shingrix: Not indicated  HPV:  Not indicated     TB: low risk     ASSESSMENT/PLAN:  Matthias was seen today for travel clinic.    Diagnoses and all orders for this visit:    Travel advice encounter  -     atovaquone-proguanil (MALARONE) 250-100 MG tablet; Take 1 tablet by mouth daily Start 2 days before exposure to Malaria and continue daily till  7 days after exposure.  -     azithromycin (ZITHROMAX) 500 MG tablet; Take 1 tablet (500 mg) by mouth daily for 3 doses Take 1 tablet a day for up to 3 days for severe diarrhea    Other orders  -     HEPATITIS A VACCINE (ADULT)      I have reviewed general recommendations for safe travel   including: food/water precautions, insect precautions,  roadway safety. Educational materials and Travax report  provided.    Malaraia prophylaxis recommended: Malarone  Symptomatic treatment for traveler's diarrhea: azithromycin    Personal protective measures reviewed including hand sanitizing and contact precautions for the prevention of viral illnesses. Cover coughs and masking  during travel and upon return.  Current COVID 19 pandemic.   Monitor / follow current CDC guidelines.      Evacuation insurance advised and resources were provided to patient.    Total visit time 20 minutes  with over 50% of time spent counseling patient as detailed above.    Polly Lira CNP  Certificate in Travel Health

## 2022-05-26 ENCOUNTER — OFFICE VISIT (OUTPATIENT)
Dept: FAMILY MEDICINE | Facility: CLINIC | Age: 32
End: 2022-05-26
Payer: COMMERCIAL

## 2022-05-26 VITALS
BODY MASS INDEX: 22 KG/M2 | DIASTOLIC BLOOD PRESSURE: 85 MMHG | SYSTOLIC BLOOD PRESSURE: 126 MMHG | TEMPERATURE: 97.4 F | WEIGHT: 132.2 LBS

## 2022-05-26 DIAGNOSIS — Z71.84 TRAVEL ADVICE ENCOUNTER: Primary | ICD-10-CM

## 2022-05-26 PROCEDURE — 90471 IMMUNIZATION ADMIN: CPT | Performed by: NURSE PRACTITIONER

## 2022-05-26 PROCEDURE — 99401 PREV MED CNSL INDIV APPRX 15: CPT | Mod: 25 | Performed by: NURSE PRACTITIONER

## 2022-05-26 PROCEDURE — 90632 HEPA VACCINE ADULT IM: CPT | Performed by: NURSE PRACTITIONER

## 2022-05-26 RX ORDER — AZITHROMYCIN 500 MG/1
500 TABLET, FILM COATED ORAL DAILY
Qty: 3 TABLET | Refills: 0 | Status: SHIPPED | OUTPATIENT
Start: 2022-05-26 | End: 2022-05-29

## 2022-05-26 RX ORDER — ATOVAQUONE AND PROGUANIL HYDROCHLORIDE 250; 100 MG/1; MG/1
1 TABLET, FILM COATED ORAL DAILY
Qty: 40 TABLET | Refills: 0 | Status: SHIPPED | OUTPATIENT
Start: 2022-05-26 | End: 2024-01-30

## 2022-05-26 NOTE — PATIENT INSTRUCTIONS
Thank you for visiting the [facility] : [phone]  Today May 26, 2022 you received the    Hepatitis A Vaccine -    Follow up vaccine appointments can be made as a NURSE ONLY visit at the Travel Clinic, (BE PREPARED TO WAIT, ) or at designated [facility].    If you are receiving the Rabies vaccines series, it is important that you follow the exact schedule ordered.     Pre-travel     We recommend that you purchase Medical Evacuation Insurance prior to your departure.  Https://wwwnc.cdc.gov/travel/page/insurance    Lolo your travel plans with the [place] [employer] through STEP ( Smart Traveler Enrollment Program ) https://step.state.gov.  STEP is a free service to allow U[place] citizens and nationals traveling and living abroad to enroll their trip with the nearest [place] Embassy or Consulate.    Animal Exposure: Avoid all mammals even if they look healthy.  If there is a bite, scratch or even a lick, wash area immediately with soap and water for 15 minutes and seek medical care within 24 hours for evaluation of Rabies post exposure treatment.  Contact your Medical Evacuation Insurance.    COVID 19 (Sars Cov2) prevention strategies  Physical distancing: Maintain 6 foot (2m) from others.              Avoid large gatherings and public transportation.   Avoid indoor shopping malls, theaters and restaurants   Practice consistent mask wearing covering the nose, mouth and underneath the chin when unable to maintain 6 foot distance from others.  Hand washing: frequent, thorough handwashing with soap and water for 20 seconds (or using a hand  containing 60% alcohol)   Avoid touching face, nose, eyes, mouth unless you have done appropriate hand washing as above.   Clean high touch surfaces with approved disinfectant against Covid 19  (70% Ethanol ) or a bleach solution (add 20 mL (4 teaspoons) of bleach to 1 L (1 quart) of water;)  Be careful not to breath or  touch bleach.      Travel Covid 19 Testing:  updated 12/06/2021  International travelers: Pre-travel: diagnostic testing (antigen or PCR) may be required for entry:  See country specific Embassy websites or airline websites.    US ENTRY Requirements: Effective December 6, 2021, all international arrivals to the [place] (regardless of vaccination status or citizenship status) by air are required to have a negative predeparture COVID-19 result from a test taken no more than 1 calendar day prior to departure of the flight to the [place]. Many complex scenarios may result from the 1-day rule. For example, for a flight that arrives in the [place] on a Monday, the test must have been taken no earlier than Sunday local time in the departure city. If the itinerary contains multiple flights, the test can be taken 1 day prior to departure of the first flight or can be taken en route, as long as the connecting airport is not in the [place]. If the test is unable to be taken en route, the traveler will not be able to enter the [place], or if the test is taken en route and is positive, the traveler will have to isolate in that location. If the itinerary contains 1 or more overnight stays en route to the [place], the test must have been taken 1 calendar day before the flight that will enter the [place]; however, if the itinerary has an overnight connection due to limitations in flight availability, retesting will not be required. If the first flight within an itinerary is delayed due to severe weather, aircraft mechanical issues, or other issues outside of the traveler's control, the traveler will only need to be retested if the delay causes the original test to be 24 hours or more past the 1-day window. If a connecting flight is delayed due to any of the above issues, the traveler will only need to be retested if the delay causes the original test to be 48 hours or more past the 1-day window. If a trip of less than 1 day is made out the [place], a viral test taken in  the [place] may be used as a predeparture test as long as the test was taken no more than 1 day prior to rearrival in the [place]; however, if a delay occurs on the return trip and the predeparture test is out of the 1-day window, the traveler will need to be retested before returning to the [place]. Noncitizen nonimmigrants who are unvaccinated remain banned from entry    Post travel: CDC recommends getting tested 3-5 days after your trip AND stay home and self-quarantine for 7 days.      COVID-19 testing scheduling number for pre-travel through [facility]  [phone] (Must have an order). Available 24 hours a day.  You can also schedule through My Chart.     Post-travel illness:  Contact your provider or [facility] if you develop a fever, rash, cough, diarrhea or other symptoms for up to 1 year after travel.  Inform your healthcare provider when and where you traveled to.    Please call the [facility] with any questions [phone]  Or send your provider a 'My Chart' note.

## 2022-11-03 ENCOUNTER — OFFICE VISIT (OUTPATIENT)
Dept: URGENT CARE | Facility: URGENT CARE | Age: 32
End: 2022-11-03
Payer: COMMERCIAL

## 2022-11-03 VITALS
BODY MASS INDEX: 22.96 KG/M2 | WEIGHT: 138 LBS | SYSTOLIC BLOOD PRESSURE: 131 MMHG | RESPIRATION RATE: 18 BRPM | HEART RATE: 70 BPM | DIASTOLIC BLOOD PRESSURE: 88 MMHG | OXYGEN SATURATION: 99 % | TEMPERATURE: 99 F

## 2022-11-03 DIAGNOSIS — S16.1XXA STRAIN OF NECK MUSCLE, INITIAL ENCOUNTER: ICD-10-CM
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
## 2022-11-03 PROCEDURE — 99213 OFFICE O/P EST LOW 20 MIN: CPT | Performed by: PHYSICIAN ASSISTANT

## 2022-11-03 RX ORDER — IBUPROFEN 800 MG/1
800 TABLET, FILM COATED ORAL EVERY 8 HOURS PRN
Qty: 30 TABLET | Refills: 0 | Status: SHIPPED | OUTPATIENT
Start: 2022-11-03 | End: 2024-01-30

## 2022-11-03 ASSESSMENT — PAIN SCALES - GENERAL: PAINLEVEL: SEVERE PAIN (6)
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
## 2023-01-07 ENCOUNTER — HEALTH MAINTENANCE LETTER (OUTPATIENT)
Age: 33
End: 2023-01-07

## 2023-03-10 ENCOUNTER — PATIENT OUTREACH (OUTPATIENT)
Dept: OBGYN | Facility: CLINIC | Age: 33
End: 2023-03-10
Payer: COMMERCIAL

## 2023-03-10 DIAGNOSIS — R87.610 ASCUS WITH POSITIVE HIGH RISK HPV CERVICAL: ICD-10-CM

## 2023-03-10 DIAGNOSIS — R87.810 ASCUS WITH POSITIVE HIGH RISK HPV CERVICAL: ICD-10-CM

## 2023-04-20 ENCOUNTER — PATIENT OUTREACH (OUTPATIENT)
Dept: CARE COORDINATION | Facility: CLINIC | Age: 33
End: 2023-04-20
Payer: COMMERCIAL

## 2023-05-08 NOTE — TELEPHONE ENCOUNTER
FYI to provider - Patient is lost to pap tracking follow-up. Attempts to contact pt have been made per reminder process and there has been no reply and/or no appt scheduled. Contact hx listed below.       1/23/18 ASCUS, +HR HPV, not 16/18. Plan colp   5/1/18 [place] [facility]: neg. Plan 1 year cotest  1/29/2019: Pap: NIL, Neg HR HPV result. Plan cotest in 3 years  3/23/22 ASCUS pap, + HR HPV (not 16/18). Plan 1 year cotest  03/30/22 Result letter sent in [employer] - viewed   --  03/10/23 Reminder [employer]  4/10/23 reminder call. Left msg  05/08/23 Lost to follow-up for pap tracking

## 2023-05-11 ENCOUNTER — HOSPITAL ENCOUNTER (EMERGENCY)
Facility: CLINIC | Age: 33
Discharge: HOME | End: 2023-05-11
Attending: EMERGENCY MEDICINE | Admitting: EMERGENCY MEDICINE
Payer: COMMERCIAL

## 2023-05-11 ENCOUNTER — NURSE TRIAGE (OUTPATIENT)
Dept: INTERNAL MEDICINE | Facility: CLINIC | Age: 33
End: 2023-05-11
Payer: COMMERCIAL

## 2023-05-11 ENCOUNTER — TELEPHONE (OUTPATIENT)
Dept: INTERNAL MEDICINE | Facility: CLINIC | Age: 33
End: 2023-05-11

## 2023-05-11 VITALS
OXYGEN SATURATION: 99 % | TEMPERATURE: 98.4 F | SYSTOLIC BLOOD PRESSURE: 140 MMHG | DIASTOLIC BLOOD PRESSURE: 88 MMHG | HEART RATE: 94 BPM | RESPIRATION RATE: 20 BRPM

## 2023-05-11 DIAGNOSIS — N93.8 DUB (DYSFUNCTIONAL UTERINE BLEEDING): ICD-10-CM

## 2023-05-11 LAB
ALBUMIN SERPL BCG-MCNC: 5.2 G/DL (ref 3.5–5.2)
ALBUMIN UR-MCNC: 20 MG/DL
ALP SERPL-CCNC: 59 U/L (ref 35–104)
ALT SERPL W P-5'-P-CCNC: 16 U/L (ref 10–35)
ANION GAP SERPL CALCULATED.3IONS-SCNC: 13 MMOL/L (ref 7–15)
APPEARANCE UR: ABNORMAL
AST SERPL W P-5'-P-CCNC: 21 U/L (ref 10–35)
BACTERIA #/AREA URNS HPF: ABNORMAL /HPF
BASOPHILS # BLD AUTO: 0 10E3/UL (ref 0–0.2)
BASOPHILS NFR BLD AUTO: 1 %
BILIRUB SERPL-MCNC: 0.9 MG/DL
BILIRUB UR QL STRIP: NEGATIVE
BUN SERPL-MCNC: 8.9 MG/DL (ref 6–20)
CALCIUM SERPL-MCNC: 9.8 MG/DL (ref 8.6–10)
CHLORIDE SERPL-SCNC: 102 MMOL/L (ref 98–107)
COLOR UR AUTO: ABNORMAL
CREAT SERPL-MCNC: 0.67 MG/DL (ref 0.51–0.95)
DEPRECATED HCO3 PLAS-SCNC: 24 MMOL/L (ref 22–29)
EOSINOPHIL # BLD AUTO: 0 10E3/UL (ref 0–0.7)
EOSINOPHIL NFR BLD AUTO: 1 %
ERYTHROCYTE [DISTWIDTH] IN BLOOD BY AUTOMATED COUNT: 12.7 % (ref 10–15)
GFR SERPL CREATININE-BSD FRML MDRD: >90 ML/MIN/1.73M2
GLUCOSE SERPL-MCNC: 92 MG/DL (ref 70–99)
GLUCOSE UR STRIP-MCNC: NEGATIVE MG/DL
HCG UR QL: NEGATIVE
HCT VFR BLD AUTO: 44.6 % (ref 35–47)
HGB BLD-MCNC: 14.8 G/DL (ref 11.7–15.7)
HGB UR QL STRIP: ABNORMAL
IMM GRANULOCYTES # BLD: 0 10E3/UL
IMM GRANULOCYTES NFR BLD: 0 %
INR PPP: 0.97 (ref 0.85–1.15)
INTERNAL QC OK POCT: NORMAL
KETONES UR STRIP-MCNC: NEGATIVE MG/DL
LEUKOCYTE ESTERASE UR QL STRIP: ABNORMAL
LYMPHOCYTES # BLD AUTO: 1.1 10E3/UL (ref 0.8–5.3)
LYMPHOCYTES NFR BLD AUTO: 32 %
MCH RBC QN AUTO: 28.4 PG (ref 26.5–33)
MCHC RBC AUTO-ENTMCNC: 33.2 G/DL (ref 31.5–36.5)
MCV RBC AUTO: 85 FL (ref 78–100)
MONOCYTES # BLD AUTO: 0.4 10E3/UL (ref 0–1.3)
MONOCYTES NFR BLD AUTO: 12 %
NEUTROPHILS # BLD AUTO: 1.9 10E3/UL (ref 1.6–8.3)
NEUTROPHILS NFR BLD AUTO: 54 %
NITRATE UR QL: NEGATIVE
NRBC # BLD AUTO: 0 10E3/UL
NRBC BLD AUTO-RTO: 0 /100
PH UR STRIP: 6 [PH] (ref 5–7)
PLATELET # BLD AUTO: 215 10E3/UL (ref 150–450)
POCT KIT EXPIRATION DATE: NORMAL
POCT KIT LOT NUMBER: NORMAL
POTASSIUM SERPL-SCNC: 3.3 MMOL/L (ref 3.4–5.3)
PROT SERPL-MCNC: 8.6 G/DL (ref 6.4–8.3)
RBC # BLD AUTO: 5.22 10E6/UL (ref 3.8–5.2)
RBC URINE: 56 /HPF
SODIUM SERPL-SCNC: 139 MMOL/L (ref 136–145)
SP GR UR STRIP: 1 (ref 1–1.03)
SQUAMOUS EPITHELIAL: 4 /HPF
UROBILINOGEN UR STRIP-MCNC: NORMAL MG/DL
WBC # BLD AUTO: 3.5 10E3/UL (ref 4–11)
WBC URINE: 16 /HPF

## 2023-05-11 PROCEDURE — 85018 HEMOGLOBIN: CPT | Performed by: EMERGENCY MEDICINE

## 2023-05-11 PROCEDURE — 36415 COLL VENOUS BLD VENIPUNCTURE: CPT | Performed by: EMERGENCY MEDICINE

## 2023-05-11 PROCEDURE — 99284 EMERGENCY DEPT VISIT MOD MDM: CPT | Performed by: EMERGENCY MEDICINE

## 2023-05-11 PROCEDURE — 80053 COMPREHEN METABOLIC PANEL: CPT | Performed by: EMERGENCY MEDICINE

## 2023-05-11 PROCEDURE — 81025 URINE PREGNANCY TEST: CPT | Performed by: EMERGENCY MEDICINE

## 2023-05-11 PROCEDURE — 99284 EMERGENCY DEPT VISIT MOD MDM: CPT | Mod: 25 | Performed by: EMERGENCY MEDICINE

## 2023-05-11 PROCEDURE — 87086 URINE CULTURE/COLONY COUNT: CPT | Performed by: EMERGENCY MEDICINE

## 2023-05-11 PROCEDURE — 84702 CHORIONIC GONADOTROPIN TEST: CPT

## 2023-05-11 PROCEDURE — 258N000003 HC RX IP 258 OP 636: Performed by: EMERGENCY MEDICINE

## 2023-05-11 PROCEDURE — 85610 PROTHROMBIN TIME: CPT | Performed by: EMERGENCY MEDICINE

## 2023-05-11 PROCEDURE — 81003 URINALYSIS AUTO W/O SCOPE: CPT | Performed by: EMERGENCY MEDICINE

## 2023-05-11 PROCEDURE — 250N000011 HC RX IP 250 OP 636: Performed by: EMERGENCY MEDICINE

## 2023-05-11 PROCEDURE — 96374 THER/PROPH/DIAG INJ IV PUSH: CPT | Performed by: EMERGENCY MEDICINE

## 2023-05-11 PROCEDURE — 96361 HYDRATE IV INFUSION ADD-ON: CPT | Performed by: EMERGENCY MEDICINE

## 2023-05-11 RX ORDER — KETOROLAC TROMETHAMINE 15 MG/ML
15 INJECTION, SOLUTION INTRAMUSCULAR; INTRAVENOUS ONCE
Status: COMPLETED | OUTPATIENT
Start: 2023-05-11 | End: 2023-05-11

## 2023-05-11 RX ADMIN — SODIUM CHLORIDE 1000 ML: 9 INJECTION, SOLUTION INTRAVENOUS at 14:55

## 2023-05-11 RX ADMIN — KETOROLAC TROMETHAMINE 15 MG: 15 INJECTION, SOLUTION INTRAMUSCULAR; INTRAVENOUS at 14:54

## 2023-05-11 ASSESSMENT — ACTIVITIES OF DAILY LIVING (ADL): ADLS_ACUITY_SCORE: 35

## 2023-05-11 NOTE — TELEPHONE ENCOUNTER
See Triage encounter from 5/11/23. Triaged pt. Attempted to contact OBGYN, no answer. Per triage protocol, go to ED/UC C now (or to office with PCP approval). Pt agreed to go to ED/UCC.

## 2023-05-11 NOTE — DISCHARGE INSTRUCTIONS
Your hemoglobin is stable at 14.     You do not require any blood products in the ER.     Please make an appointment to follow up with Your Primary OB/GYN Provider or OB/Gyn - [facility] (phone: [phone]) in 3-5 days even if entirely better.    Take ibuprofen as needed for cramps.     Return to the ER if any other problems/concerns.

## 2023-05-11 NOTE — ED TRIAGE NOTES
Pt arrives ambulatory to ED   C/o bleeding for the past 10 days, which started about 10 days before her period was suppose to start   Usually period lasts 3-4 days  Has been more fatigued the past few days and noticed clots

## 2023-05-11 NOTE — TELEPHONE ENCOUNTER
"Pt calling stating that she has been bleeding for 10 days. Her period also came 10 days early. She states that she has been having netta size clots twice a day. Pt states that she is having \"a little\" abdominal pain. Pt states that she changes her pad every 2-3 hours. Her period has been the same amount for 10 days. This is no more or less than what her typical period would be. She has been feeling fatigued. Yesterday she was nauseous for 2-3 hours.     Please advise. Thanks    Nanette Ansari RN  [facility]   "

## 2023-05-11 NOTE — ED PROVIDER NOTES
ED Provider Note  [facility]      History     Chief Complaint   Patient presents with     Vaginal Bleeding     The history is provided by the patient and medical records.     Matthias Quarles is a 32 year old female  with a history of ASCUS and chronic leukopenia who presents to the ED for evaluation of vaginal bleeding. Patient reports experiencing a menstrual period for 10 days.  She states this is abnormal for her as her period typically last 3 to 5 days.  She typically has clotting during her periods but states it has been heavier for the past 10 days.  She does not believe she is bleeding more than usual, but states it has just been going on for much longer.  Patient does endorse cramping abdominal pain, which states is typical for her menstrual periods.  Patient's last period on 4/15 was on time, regular and lasted approximately 3 days.  Patient denies experiencing periods for this many days in the past.  Patient states she would be surprised if she was pregnant but it is not impossible.  She is not currently trying to get pregnant as she is planning a wedding.  She denies prior pregnancies.  Patient denies concern for STD.  No recent dysuria.  Patient is not currently on contraceptive medications.  The patient reports a history of hospitalizations due to painful cramps many years ago.  She did take contraceptive medications to relieve her period symptoms but stopped 3 years ago.  Patient did see OB/GYN last year due to worsening abdominal cramps for evaluation for endometriosis but states she was not diagnosed and did not continue with the follow-up.  The patient takes fenugreek pills daily for her skin care but denies use of daily medications.      Past Medical History  Past Medical History:   Diagnosis Date     ASCUS with positive high risk HPV cervical 2018    3/2022     Benign headache     1-2 x monthly     Chronic leukopenia     benign; has been evaluated by aneta in  past (12/2012/); no intervention indicated; regular monitoring NOT indicated (only check as clinically indicated)     Past Surgical History:   Procedure Laterality Date     NO HISTORY OF SURGERY       atovaquone-proguanil (MALARONE) 250-100 MG tablet  ibuprofen (ADVIL/MOTRIN) 800 MG tablet  tiZANidine (ZANAFLEX) 4 MG tablet      Allergies   Allergen Reactions     Amoxicillin Hives and Itching     Family History  Family History   Problem Relation Age of Onset     Heart Disease Maternal Grandfather         details unknown     Diabetes Type 2  Paternal Grandfather      Cerebrovascular Disease Paternal Grandfather         later in life     Heart Surgery Maternal Uncle         details unknown     Myocardial Infarction No family hx of      Coronary Artery Disease Early Onset No family hx of      Colon Cancer No family hx of      Breast Cancer No family hx of      Ovarian Cancer No family hx of      Social History   Social History     Tobacco Use     Smoking status: Never     Smokeless tobacco: Never   Substance Use Topics     Alcohol use: Yes     Drug use: No         A medically appropriate review of systems was performed with pertinent positives and negatives noted in the HPI, and all other systems negative.    Physical Exam      Physical Exam  Exam conducted with a chaperone present.   Constitutional:       General: She is not in acute distress.     Appearance: She is well-developed. She is not ill-appearing, toxic-appearing or diaphoretic.   HENT:      Head: Normocephalic and atraumatic.   Cardiovascular:      Rate and Rhythm: Normal rate and regular rhythm.      Pulses: Normal pulses.      Heart sounds: Normal heart sounds.   Pulmonary:      Effort: Pulmonary effort is normal. No respiratory distress.      Breath sounds: Normal breath sounds.   Abdominal:      General: There is no distension.      Palpations: Abdomen is soft. There is no mass.      Tenderness: There is no abdominal tenderness. There is no rebound.       Hernia: No hernia is present.   Genitourinary:     General: Normal vulva.      Exam position: Supine.      Vagina: Normal. No vaginal discharge.      Cervix: No cervical motion tenderness or friability.      Uterus: Normal.       Adnexa: Right adnexa normal and left adnexa normal.   Musculoskeletal:         General: No tenderness.      Cervical back: Normal range of motion.   Skin:     General: Skin is warm and dry.   Neurological:      Mental Status: She is alert and oriented to person, place, and time.   Psychiatric:         Behavior: Behavior normal.         Thought Content: Thought content normal.           ED Course, Procedures, & Data     1:57 PM  The patient was seen and examined by Dr. Екатерина Villalpando in Room VTA.     Procedures       Results for orders placed or performed during the hospital encounter of 05/11/23   Comprehensive metabolic panel     Status: Abnormal   Result Value Ref Range    Sodium 139 136 - 145 mmol/L    Potassium 3.3 (L) 3.4 - 5.3 mmol/L    Chloride 102 98 - 107 mmol/L    Carbon Dioxide (CO2) 24 22 - 29 mmol/L    Anion Gap 13 7 - 15 mmol/L    Urea Nitrogen 8.9 6.0 - 20.0 mg/dL    Creatinine 0.67 0.51 - 0.95 mg/dL    Calcium 9.8 8.6 - 10.0 mg/dL    Glucose 92 70 - 99 mg/dL    Alkaline Phosphatase 59 35 - 104 U/L    AST 21 10 - 35 U/L    ALT 16 10 - 35 U/L    Protein Total 8.6 (H) 6.4 - 8.3 g/dL    Albumin 5.2 3.5 - 5.2 g/dL    Bilirubin Total 0.9 <=1.2 mg/dL    GFR Estimate >90 >60 mL/min/1.73m2   INR     Status: Normal   Result Value Ref Range    INR 0.97 0.85 - 1.15   UA with Microscopic reflex to Culture     Status: Abnormal    Specimen: Urine, Midstream   Result Value Ref Range    Color Urine Straw Colorless, Straw, Light Yellow, Yellow    Appearance Urine Slightly Cloudy (A) Clear    Glucose Urine Negative Negative mg/dL    Bilirubin Urine Negative Negative    Ketones Urine Negative Negative mg/dL    Specific Gravity Urine 1.005 1.003 - 1.035    Blood Urine Large (A) Negative    pH  Urine 6.0 5.0 - 7.0    Protein Albumin Urine 20 (A) Negative mg/dL    Urobilinogen Urine Normal Normal, 2.0 mg/dL    Nitrite Urine Negative Negative    Leukocyte Esterase Urine Moderate (A) Negative    Bacteria Urine Few (A) None Seen /HPF    RBC Urine 56 (H) <=2 /HPF    WBC Urine 16 (H) <=5 /HPF    Squamous Epithelials Urine 4 (H) <=1 /HPF    Narrative    Urine Culture ordered based on laboratory criteria   CBC with platelets and differential     Status: Abnormal   Result Value Ref Range    WBC Count 3.5 (L) 4.0 - 11.0 10e3/uL    RBC Count 5.22 (H) 3.80 - 5.20 10e6/uL    Hemoglobin 14.8 11.7 - 15.7 g/dL    Hematocrit 44.6 35.0 - 47.0 %    MCV 85 78 - 100 fL    MCH 28.4 26.5 - 33.0 pg    MCHC 33.2 31.5 - 36.5 g/dL    RDW 12.7 10.0 - 15.0 %    Platelet Count 215 150 - 450 10e3/uL    % Neutrophils 54 %    % Lymphocytes 32 %    % Monocytes 12 %    % Eosinophils 1 %    % Basophils 1 %    % Immature Granulocytes 0 %    NRBCs per 100 WBC 0 <1 /100    Absolute Neutrophils 1.9 1.6 - 8.3 10e3/uL    Absolute Lymphocytes 1.1 0.8 - 5.3 10e3/uL    Absolute Monocytes 0.4 0.0 - 1.3 10e3/uL    Absolute Eosinophils 0.0 0.0 - 0.7 10e3/uL    Absolute Basophils 0.0 0.0 - 0.2 10e3/uL    Absolute Immature Granulocytes 0.0 <=0.4 10e3/uL    Absolute NRBCs 0.0 10e3/uL   hCG qual urine POCT     Status: Normal   Result Value Ref Range    HCG Qual Urine Negative Negative    Internal QC Check POCT Valid Valid    POCT Kit Lot Number [id number]     POCT Kit Expiration Date 06/30/2024    CBC with platelets differential     Status: Abnormal    Narrative    The following orders were created for panel order CBC with platelets differential.  Procedure                               Abnormality         Status                     ---------                               -----------         ------                     CBC with platelets and d...[[id number]]  Abnormal            Final result                 Please view results for these tests on the individual  orders.     Medications   0.9% sodium chloride BOLUS (0 mLs Intravenous Stopped 5/11/23 1558)   ketorolac (TORADOL) injection 15 mg (15 mg Intravenous $Given 5/11/23 0752)                    No results found for any visits on 05/11/23.  Medications - No data to display  Labs Ordered and Resulted from Time of ED Arrival to Time of ED Departure - No data to display  No orders to display          Critical care was not performed.     Medical Decision Making  The patient's presentation was of moderate complexity (an acute illness with systemic symptoms).    The patient's evaluation involved:  ordering and/or review of 3+ test(s) in this encounter (CBC, BMP)  review of 2 test result(s) ordered prior to this encounter (CBC, BMP)    The patient's management necessitated moderate risk (prescription drug management including medications given in the ED).      Assessment & Plan    Patient is a 32-year-old female who presented to the ER due to 10 days of vaginal bleeding.  Patient normally has regular periods.  Patient currently does not have any significant bleeding.  She has minor bleeding on exam.  Patient's hemoglobin is stable.  Patient does have a remote history of possible endometriosis which could be contributing to her irregular periods.  Plan is for patient to be discharged home with outpatient OB/GYN follow-up.  Plan of care was discussed with the patient.  Patient's UA and UCG are both negative.    I have reviewed the nursing notes. I have reviewed the findings, diagnosis, plan and need for follow up with the patient.    New Prescriptions    No medications on file       Final diagnoses:   DUB (dysfunctional uterine bleeding)     Kenan MIDDLETON, am serving as a trained medical scribe to document services personally performed by Екатерина Villalpando MD, based on the provider's statements to me.      Екатерина MIDDLETON MD, was physically present and have reviewed and verified the accuracy of this note documented by Kenan  Kristel.     Екатерина Villalpando MD  [facility] EMERGENCY DEPARTMENT  5/11/2023     Екатерина Villalpando MD  05/11/23 [id number]

## 2023-05-11 NOTE — TELEPHONE ENCOUNTER
I havent seen this pt since 2017 but looking at her chart she has seen ob-gyn with some regularity - so seems most appropriate that this should be forwarded to them .

## 2023-05-11 NOTE — TELEPHONE ENCOUNTER
"Nurse Triage SBAR     Is this a 2nd Level Triage? NO    Situation: Received a message that pt is concerned about her period being 10 days early and lasting longer than normal.     Background: Pts periods usually last about 3-5 day and normally can be a week early or late. Pt normally has cramping with periods.       This is pts first period since not being vegan.     Pt stated there is a possibility she could be pregnant.     Pt is currently taking Vitamin pills (fenugreek).     Assessment: Pt is changing pad every 2-3 hours. Blood is red in color. Clots are about netta sized. Pt is having her normal cramping that she gets during her periods. However, she is also experiencing dull abd pain that started yesterday and has been constant. Pt rates abd pain a 4/10.   Feels fatiged, groggy.   Nauseated a couple hours yesterday.     Protocol Recommended Disposition:   Go To ED/UCC Now (Or To Office With PCP Approval). Pt stated she will go to the ED or UC.       Constant abd pain since yesterday   1. AMOUNT: \"Describe the bleeding that you are having.\"     - SPOTTING: spotting, or pinkish / brownish mucous discharge; does not fill panty liner or pad     - MILD:  less than 1 pad / hour; less than patient's usual menstrual bleeding    - MODERATE: 1-2 pads / hour; 1 menstrual cup every 6 hours; small-medium blood clots (e.g., pea, grape, small coin)    - SEVERE: soaking 2 or more pads/hour for 2 or more hours; 1 menstrual cup every 2 hours; bleeding not contained by pads or continuous red blood from vagina; large blood clots (e.g., golf ball, large coin)       Red blood, 1 pad every 2-3 hrs   2. ONSET: \"When did the bleeding begin?\" \"Is it continuing now?\"       5/1  3. MENSTRUAL PERIOD: \"When was the last normal menstrual period?\" \"How is this different than your period?\"      4/15  4. REGULARITY: \"How regular are your periods?\"      Usually early or late by a week   5. ABDOMINAL PAIN: \"Do you have any pain?\" \"How bad is the " "pain?\"  (e.g., Scale 1-10; mild, moderate, or severe)    - MILD (1-3): doesn't interfere with normal activities, abdomen soft and not tender to touch     - MODERATE (4-7): interferes with normal activities or awakens from sleep, abdomen tender to touch     - SEVERE (8-10): excruciating pain, doubled over, unable to do any normal activities       4/10- dull   6. PREGNANCY: \"Could you be pregnant?\" \"Are you sexually active?\" \"Did you recently give birth?\"      Possibility   7. BREASTFEEDING: \"Are you breastfeeding?\"      No  8. HORMONES: \"Are you taking any hormone medications, prescription or OTC?\" (e.g., birth control pills, estrogen)      Vitamin pills (fenugreek)   9. BLOOD THINNERS: \"Do you take any blood thinners?\" (e.g., Coumadin/warfarin, Pradaxa/dabigatran, aspirin)      Unknown   10. CAUSE: \"What do you think is causing the bleeding?\" (e.g., recent gyn surgery, recent gyn procedure; known bleeding disorder, cervical cancer, polycystic ovarian disease, fibroids)          none  11. HEMODYNAMIC STATUS: \"Are you weak or feeling lightheaded?\" If Yes, ask: \"Can you stand and walk normally?\"         None  12. OTHER SYMPTOMS: \"What other symptoms are you having with the bleeding?\" (e.g., passed tissue, vaginal discharge, fever, menstrual-type cramps)        Menstrual-type cramps     Reason for Disposition    Constant abdominal pain lasting > 2 hours    Additional Information    Negative: SEVERE vaginal bleeding (e.g., continuous red blood from vagina, or large blood clots) and very weak (can't stand)    Negative: Passed out (i.e., fainted, collapsed and was not responding)    Negative: Difficult to awaken or acting confused (e.g., disoriented, slurred speech)    Negative: Shock suspected (e.g., cold/pale/clammy skin, too weak to stand, low BP, rapid pulse)    Negative: Sounds like a life-threatening emergency to the triager    Negative: Pregnant 20 or more weeks (5 months or more)    Negative: Pregnant < 20 weeks " (less than 5 months)    Negative: Postpartum (from 0 to 6 weeks after delivery)    Negative: Vaginal discharge is main symptom and bleeding is slight    Negative: SEVERE abdominal pain (e.g., excruciating)    Negative: SEVERE dizziness (e.g., unable to stand, requires support to walk, feels like passing out now)    Negative: SEVERE vaginal bleeding (e.g., soaking 2 pads or tampons per hour and present 2 or more hours; 1 menstrual cup every 2 hours)    Negative: MODERATE vaginal bleeding (i.e., soaking pad or tampon per hour and present > 6 hours; 1 menstrual cup every 6 hours)    Negative: Pale skin (pallor) of new-onset or worsening    Protocols used: VAGINAL BLEEDING - [id number]

## 2023-05-12 LAB — BACTERIA UR CULT: NO GROWTH

## 2023-05-12 NOTE — RESULT ENCOUNTER NOTE
"Final urine culture report shows \"NO GROWTH\" and is NEGATIVE.  [facility] Emergency Dept discharge antibiotic: None  Recommendations in treatment per [facility] ED Lab result Urine culture protocol.  "

## 2023-05-15 ENCOUNTER — E-VISIT (OUTPATIENT)
Dept: INTERNAL MEDICINE | Facility: CLINIC | Age: 33
End: 2023-05-15
Payer: COMMERCIAL

## 2023-05-15 DIAGNOSIS — N93.8 DUB (DYSFUNCTIONAL UTERINE BLEEDING): Primary | ICD-10-CM

## 2023-05-15 LAB — HCG INTACT+B SERPL-ACNC: <1 MIU/ML

## 2023-05-15 PROCEDURE — 99421 OL DIG E/M SVC 5-10 MIN: CPT | Performed by: PHYSICIAN ASSISTANT

## 2023-06-02 ENCOUNTER — HEALTH MAINTENANCE LETTER (OUTPATIENT)
Age: 33
End: 2023-06-02

## 2023-07-10 NOTE — NURSING NOTE
"Chief Complaint   Patient presents with     Colposcopy   Jaquelin Meza MA      Initial /68 (BP Location: Left arm, Patient Position: Chair, Cuff Size: Adult Regular)  Wt 116 lb 12.8 oz (53 kg)  LMP 04/27/2018  BMI 19.44 kg/m2 Estimated body mass index is 19.44 kg/(m^2) as calculated from the following:    Height as of 1/23/18: 5' 5\" (1.651 m).    Weight as of this encounter: 116 lb 12.8 oz (53 kg).  Medication Reconciliation: complete    "
131

## 2024-01-30 ENCOUNTER — OFFICE VISIT (OUTPATIENT)
Dept: MIDWIFE SERVICES | Facility: CLINIC | Age: 34
End: 2024-01-30
Payer: COMMERCIAL

## 2024-01-30 VITALS
HEIGHT: 65 IN | BODY MASS INDEX: 22.69 KG/M2 | WEIGHT: 136.2 LBS | DIASTOLIC BLOOD PRESSURE: 64 MMHG | SYSTOLIC BLOOD PRESSURE: 128 MMHG

## 2024-01-30 DIAGNOSIS — Z12.4 ENCOUNTER FOR SCREENING FOR CERVICAL CANCER: ICD-10-CM

## 2024-01-30 DIAGNOSIS — Z01.419 WELL WOMAN EXAM: Primary | ICD-10-CM

## 2024-01-30 DIAGNOSIS — R87.610 ASCUS WITH POSITIVE HIGH RISK HPV CERVICAL: ICD-10-CM

## 2024-01-30 DIAGNOSIS — Z01.419 ENCOUNTER FOR GYNECOLOGICAL EXAMINATION (GENERAL) (ROUTINE) WITHOUT ABNORMAL FINDINGS: ICD-10-CM

## 2024-01-30 DIAGNOSIS — R87.810 ASCUS WITH POSITIVE HIGH RISK HPV CERVICAL: ICD-10-CM

## 2024-01-30 PROCEDURE — G0145 SCR C/V CYTO,THINLAYER,RESCR: HCPCS

## 2024-01-30 PROCEDURE — 99395 PREV VISIT EST AGE 18-39: CPT

## 2024-01-30 PROCEDURE — 87624 HPV HI-RISK TYP POOLED RSLT: CPT

## 2024-01-30 ASSESSMENT — ANXIETY QUESTIONNAIRES
1. FEELING NERVOUS, ANXIOUS, OR ON EDGE: NOT AT ALL
IF YOU CHECKED OFF ANY PROBLEMS ON THIS QUESTIONNAIRE, HOW DIFFICULT HAVE THESE PROBLEMS MADE IT FOR YOU TO DO YOUR WORK, TAKE CARE OF THINGS AT HOME, OR GET ALONG WITH OTHER PEOPLE: NOT DIFFICULT AT ALL
5. BEING SO RESTLESS THAT IT IS HARD TO SIT STILL: NOT AT ALL
6. BECOMING EASILY ANNOYED OR IRRITABLE: NOT AT ALL
7. FEELING AFRAID AS IF SOMETHING AWFUL MIGHT HAPPEN: NOT AT ALL
3. WORRYING TOO MUCH ABOUT DIFFERENT THINGS: NOT AT ALL
2. NOT BEING ABLE TO STOP OR CONTROL WORRYING: NOT AT ALL
GAD7 TOTAL SCORE: 0
GAD7 TOTAL SCORE: 0

## 2024-01-30 ASSESSMENT — PATIENT HEALTH QUESTIONNAIRE - PHQ9
SUM OF ALL RESPONSES TO PHQ QUESTIONS 1-9: 3
5. POOR APPETITE OR OVEREATING: NOT AT ALL

## 2024-01-30 NOTE — PROGRESS NOTES
Matthias is a 33 year old  female who presents for annual exam.     Besides routine health maintenance, she has no other health concerns today .  HPI:    Matthias presents for annual exam and follow-up pap. Hx of ASCUS with HR HPV previously. Has previously had colposcopy. Brings questions regarding ASCUS diagnosis.  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 Enjoys position for now. Thinking about going back to school, considering nursing. Engaged with Tha and living together. Not trying to get pregnant but not avoiding. Okay if pregnancy were to occur. NOT interested in hormonal birth control.   Notes that she does have painful clots with periods. Intermittently will have a large clot express during period that is very painful when coming out. Has to sit on toilet and bear pain until clot comes out. This happens up to once per period. Otherwise periods are not particularly painful or heavy.   Menses are regular q 28-30 days and normal lasting 3 days.   Menses flow: normal.    Patient's last menstrual period was 2024 (exact date)..   Using none for contraception.    She is not currently considering pregnancy.    REPRODUCTIVE/GYNECOLOGIC HISTORY:  Matthias is sexually active with male partner(s) and is currently in monogamous relationship.   STI testing offered?  Declined  History of abnormal Pap smear:  Yes  SOCIAL HISTORY  Abuse: does not report having previously been physical or sexually abused.    Do you feel safe in your environment? YES       She  reports that she has never smoked. She has never used smokeless tobacco.      Last PHQ-9 score on record =       2024     3:58 PM   PHQ-9 SCORE   PHQ-9 Total Score 3     Last GAD7 score on record =       2024     3:58 PM   ANTONIETA-7 SCORE   Total Score 0     Alcohol Score = 3    HEALTH MAINTENANCE:    Care Gaps    Overdue          MAR 23  2023 YEARLY PREVENTIVE VISIT (Yearly)  Last completed: Mar 23, 2022     MAR 23  2023 PAP FOLLOW-UP  (Once)  Last completed: Mar 23, 2022     MAR 23  2023 HPV FOLLOW-UP (Once)  Last completed: Mar 23, 2022     SEP 1  2023 INFLUENZA VACCINE (1)  Last completed: 2022     SEP 1  2023 COVID-19 Vaccine (3 -  season)  Last completed: Sep 2, 2021     HECTOR 1  2024 PHQ-2 (once per calendar year) (Yearly, January to December)  Last completed: 2021         Upcoming          2026 ADVANCE CARE PLANNING (Every 5 Years)  Last completed: 2021     AUG 26  2031 DTAP/TDAP/TD IMMUNIZATION (8 - Td or Tdap)  Last completed: Aug 26, 2021         HEALTHY HABITS  Eating habits: eats regular meals  Calcium/Vitamin D intake: source:  none Adequate?  no  Exercise: How often do you exercise? 0-5 times/week; Cardio and Strength Training  Have you had an eye exam in the last two years? NO  Do you routinely see the dentist once or twice yearly? YES         HISTORY:  OB History    Para Term  AB Living   0 0 0 0 0 0   SAB IAB Ectopic Multiple Live Births   0 0 0 0 0     Past Medical History:   Diagnosis Date    ASCUS with positive high risk HPV cervical 2018    3/2022    Benign headache     1-2 x monthly    Chronic leukopenia     benign; has been evaluated by heme in past (2012/); no intervention indicated; regular monitoring NOT indicated (only check as clinically indicated)     Past Surgical History:   Procedure Laterality Date    NO HISTORY OF SURGERY       Family History   Problem Relation Age of Onset    Heart Disease Maternal Grandfather         details unknown    Diabetes Type 2  Paternal Grandfather     Cerebrovascular Disease Paternal Grandfather         later in life    Heart Surgery Maternal Uncle         details unknown    Myocardial Infarction No family hx of     Coronary Artery Disease Early Onset No family hx of     Colon Cancer No family hx of     Breast Cancer No family hx of     Ovarian Cancer No family hx of      Social History     Socioeconomic History    Marital  "status: Single     Spouse name: None    Number of children: None    Years of education: None    Highest education level: None   Occupational History    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     No kids.    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      No current outpatient medications on file.     Allergies   Allergen Reactions    Amoxicillin Hives and Itching       Past medical, surgical, social and family history were reviewed and updated in EPIC.    ROS:   12 point review of systems negative other than symptoms noted below or in the HPI.    PHYSICAL EXAM:  /64   Ht 1.651 m (5' 5\")   Wt 61.8 kg (136 lb 3.2 oz)   LMP 01/24/2024 (Exact Date)   BMI 22.66 kg/m     BMI: Body mass index is 22.66 kg/m .  Constitutional: healthy, alert and no distress  Neck: symmetrical, thyroid normal size, no masses present, no lymphadenopathy present.   Cardiovascular: RRR, no murmurs present  Respiratory: breathing unlabored, lungs CTA bilaterally  Breast:normal without masses, tenderness or nipple discharge and no palpable axillary masses or adenopathy  Gastrointestinal: abdomen soft, non-tender, bowel sounds present  PELVIC EXAM:  Vulva: No lesions, no adenopathy, BUS WNL  Vagina: Moist, pink, discharge normal  well rugated, no lesions  Cervix: nulliparous, smooth, pink, ectropion present, no other visible lesions  Uterus: Normal size, anteverted, non-tender, mobile  Ovaries: No masses palpated  Rectal exam: deferred    ASSESSMENT/PLAN:    ICD-10-CM    1. Well woman exam  Z01.419       2. Encounter for screening for cervical cancer  Z12.4 Pap thin layer screen with HPV - recommended age 30 - 65 years     HPV Hold (Lab Only)      3. " Encounter for gynecological examination (general) (routine) without abnormal findings  Z01.419       4. ASCUS with positive high risk HPV cervical  R87.610     R87.810       5. BMI 22.0-22.9, adult  Z68.22         #Annual Preventative Care  - Normal physical exam  - Mood is stable  - Labs: CBC completed last year, no further blood work needed today  - Cervical Cancer screening due, collected. Plan to follow-up per [facility]CCP guidelines  - HCV/HIV screening done previously  - BMI 22.66  - Contraception: none, pregnancy would be welcomed, but not trying    COUNSELING:   Reviewed preventive health counseling, as reflected in patient instructions  Special attention given to:        Regular exercise       Healthy diet/nutrition       Vision screening       Pap smear screening tests/results       Immunizations  Declined: Covid-19 and Influenza            Contraception       Family planning/menstrual cycles       Folic Acid       Osteoporosis prevention/bone health   reports that she has never smoked. She has never used smokeless tobacco.    40 minutes spent by me on the date of the encounter doing chart review, history and exam, documentation and further activities per the note    Ramona Carroll, APRN, CNM

## 2024-02-05 LAB
BKR LAB AP GYN ADEQUACY: NORMAL
BKR LAB AP GYN INTERPRETATION: NORMAL
BKR LAB AP HPV REFLEX: NORMAL
BKR LAB AP PREVIOUS ABNL DX: NORMAL
BKR LAB AP PREVIOUS ABNORMAL: NORMAL
PATH REPORT.COMMENTS IMP SPEC: NORMAL
PATH REPORT.COMMENTS IMP SPEC: NORMAL
PATH REPORT.RELEVANT HX SPEC: NORMAL

## 2024-02-07 LAB
HUMAN PAPILLOMA VIRUS 16 DNA: NEGATIVE
HUMAN PAPILLOMA VIRUS 18 DNA: NEGATIVE
HUMAN PAPILLOMA VIRUS FINAL DIAGNOSIS: NORMAL
HUMAN PAPILLOMA VIRUS OTHER HR: NEGATIVE

## 2024-02-14 ENCOUNTER — PATIENT OUTREACH (OUTPATIENT)
Dept: OBGYN | Facility: CLINIC | Age: 34
End: 2024-02-14
Payer: COMMERCIAL

## 2024-02-14 NOTE — TELEPHONE ENCOUNTER
"1/30/24 NIL pap, neg HR HPV. Plan 1 year cotest  Per provider: \"Since she is outside the normal time recommendation, lets do 1 more cotest in 1 yr.  If that is NIL/HPV neg, then would recommend returning to guidelines.\"  "

## 2024-06-12 ENCOUNTER — HOSPITAL ENCOUNTER (EMERGENCY)
Facility: CLINIC | Age: 34
Discharge: HOME | End: 2024-06-13
Attending: EMERGENCY MEDICINE | Admitting: EMERGENCY MEDICINE
Payer: COMMERCIAL

## 2024-06-12 VITALS
OXYGEN SATURATION: 100 % | HEART RATE: 84 BPM | DIASTOLIC BLOOD PRESSURE: 95 MMHG | TEMPERATURE: 97.6 F | RESPIRATION RATE: 20 BRPM | SYSTOLIC BLOOD PRESSURE: 144 MMHG

## 2024-06-12 DIAGNOSIS — M62.830 BACK MUSCLE SPASM: ICD-10-CM

## 2024-06-12 PROCEDURE — 99284 EMERGENCY DEPT VISIT MOD MDM: CPT

## 2024-06-12 ASSESSMENT — COLUMBIA-SUICIDE SEVERITY RATING SCALE - C-SSRS
6. HAVE YOU EVER DONE ANYTHING, STARTED TO DO ANYTHING, OR PREPARED TO DO ANYTHING TO END YOUR LIFE?: NO
1. IN THE PAST MONTH, HAVE YOU WISHED YOU WERE DEAD OR WISHED YOU COULD GO TO SLEEP AND NOT WAKE UP?: NO
2. HAVE YOU ACTUALLY HAD ANY THOUGHTS OF KILLING YOURSELF IN THE PAST MONTH?: NO

## 2024-06-12 ASSESSMENT — ACTIVITIES OF DAILY LIVING (ADL)
ADLS_ACUITY_SCORE: 35

## 2024-06-13 PROCEDURE — 250N000013 HC RX MED GY IP 250 OP 250 PS 637: Performed by: EMERGENCY MEDICINE

## 2024-06-13 RX ORDER — IBUPROFEN 600 MG/1
600 TABLET, FILM COATED ORAL ONCE
Status: COMPLETED | OUTPATIENT
Start: 2024-06-13 | End: 2024-06-13

## 2024-06-13 RX ORDER — OXYCODONE HYDROCHLORIDE 5 MG/1
5 TABLET ORAL ONCE
Status: COMPLETED | OUTPATIENT
Start: 2024-06-13 | End: 2024-06-13

## 2024-06-13 RX ORDER — LIDOCAINE 50 MG/G
2 PATCH TOPICAL EVERY 24 HOURS
Qty: 20 PATCH | Refills: 0 | Status: SHIPPED | OUTPATIENT
Start: 2024-06-13

## 2024-06-13 RX ORDER — IBUPROFEN 600 MG/1
600 TABLET, FILM COATED ORAL EVERY 6 HOURS PRN
Qty: 20 TABLET | Refills: 0 | Status: SHIPPED | OUTPATIENT
Start: 2024-06-13

## 2024-06-13 RX ORDER — LIDOCAINE 4 G/G
2 PATCH TOPICAL ONCE
Qty: 2 PATCH | Refills: 0 | Status: DISCONTINUED | OUTPATIENT
Start: 2024-06-13 | End: 2024-06-13 | Stop reason: HOSPADM

## 2024-06-13 RX ADMIN — LIDOCAINE 2 PATCH: 4 PATCH TOPICAL at 00:43

## 2024-06-13 RX ADMIN — IBUPROFEN 600 MG: 600 TABLET ORAL at 00:43

## 2024-06-13 RX ADMIN — OXYCODONE HYDROCHLORIDE 5 MG: 5 TABLET ORAL at 00:42

## 2024-06-13 NOTE — ED PROVIDER NOTES
Emergency Department Note      History of Present Illness     Chief Complaint  Back Pain    HPI  Matthias Quarles is a 33 year old female who arrives to the emergency department due to back spasms.  The patient reports that 2 days ago she used a foam roller on her back and then later on felt spasms on both sides of her mid back.  She states that the back pain kept her up last night and feels like it tightness of her back.  She reports that walking around improves her back pain but sitting or lying flat worsens her back pain.  She was unable to sleep last night due to the pain and the pain did not improved after massage today.  She has been taking ibuprofen and Tylenol as needed for pain.  She began came slightly nauseated today but no vomiting and the nausea has resolved.  No chest pain, shortness of breath, fevers, chills, cough, cold symptoms.  No neck pain, headache, abdominal pain or UTI symptoms.  No numbness or weakness.  No trouble with bowel or bladder function.  No dysuria, urgency or frequency of urination.  No hematuria.  No radiation of pain down her arms or legs.    Independent Historian  None    Review of External Notes  None  Past Medical History   Medical History and Problem List  Past Medical History:   Diagnosis Date    ASCUS with positive high risk HPV cervical 01/23/2018    Benign headache     Chronic leukopenia        Medications  ibuprofen (ADVIL/MOTRIN) 600 MG tablet  lidocaine (LIDODERM) 5 % patch  tiZANidine (ZANAFLEX) 4 MG tablet        Surgical History   Past Surgical History:   Procedure Laterality Date    NO HISTORY OF SURGERY       Physical Exam   Patient Vitals for the past 24 hrs:   BP Temp Temp src Pulse Resp SpO2   06/12/24 2035 (!) 144/95 97.6  F (36.4  C) Temporal 84 20 100 %     Physical Exam  Nursing note and vitals reviewed.  Constitutional:  Appears well-developed and well-nourished.   HENT:   Head:    Atraumatic.   Mouth/Throat:   Oropharynx is clear and moist. No  oropharyngeal exudate.   Eyes:    Pupils are equal, round, and reactive to light.   Neck:    Normal range of motion. Neck supple.      No tracheal deviation present. No thyromegaly present.   Cardiovascular:  Normal rate, regular rhythm, no murmur   Pulmonary/Chest: Breath sounds are clear and equal without wheezes or crackles.  Abdominal:   Soft. Bowel sounds are normal. Exhibits no distension and      no mass. There is no tenderness.      There is no rebound and no guarding.   Back Exam:                 Tight musculature with palpable muscle spasms on both sides of her mid thoracic spine level.  No rash or swelling.  Nontender midline of the thoracic and lumbar spines.  Musculoskeletal:  Exhibits no edema.   Lymphadenopathy:  No cervical adenopathy.   Neurological:   Alert and oriented to person, place, and time. GCS 15.  CN 2-12 intact.  and proximal upper extremity strength strong and equal.  Bilateral lower extremity strength strong and equal.  Sensation intact and equal to the face, arms and legs.  No facial droop or weakness. Normal speech.  Follows commands and answers questions normally.      Skin:    Skin is warm and dry. No rash noted. No pallor.     Diagnostics   Lab Results   Labs Ordered and Resulted from Time of ED Arrival to Time of ED Departure - No data to display      Independent Interpretation  None  ED Course    Medications Administered  Medications   ibuprofen (ADVIL/MOTRIN) tablet 600 mg (600 mg Oral $Given 6/13/24 0043)   oxyCODONE (ROXICODONE) tablet 5 mg (5 mg Oral $Given 6/13/24 0042)       Procedures  Procedures     Discussion of Management  None    Social Determinants of Health adding to complexity of care  None    ED Course     Medical Decision Making / Diagnosis   CMS Diagnoses: None    MIPS     None    [facility] JOHNNY Quarles is a 33 year old female who arrives due to back spasms after using a foam roller.  I feel that her symptoms are due to muscle spasm of her back muscles.  I did  not feel that there was any sign of serious spinal cord problem such as cauda equina syndrome, epidural hematoma, epidural abscess, spinal cord compression, transverse myelitis.  I also did not feel there was any sign of pulmonary embolism, cardiac problem, infectious problem, UTI or ureteral calculus.  He is not having any red flags to suggest MRI imaging of her back.  There was no fall or injuries concerning for fracture so x-rays were not indicated the patient was agreeing with this.  She was given the above medications and discharged home with a prescription for muscle relaxer and prescription strength ibuprofen and lidocaine patches.  She was instructed on signs and symptoms to return for including numbness, weakness, trouble with bowel or bladder function radicular pain.  I felt she be safely discharged home and she was told to follow-up in her clinic in 2 days for recheck.    Disposition  The patient was discharged.     ICD-10 Codes:    ICD-10-CM    1. Back muscle spasm  M62.830            Discharge Medications  Discharge Medication List as of 6/13/2024 12:33 AM        START taking these medications    Details   ibuprofen (ADVIL/MOTRIN) 600 MG tablet Take 1 tablet (600 mg) by mouth every 6 hours as needed for moderate pain (Take with food), Disp-20 tablet, R-0, E-Prescribe      lidocaine (LIDODERM) 5 % patch Place 2 patches onto the skin every 24 hours To prevent lidocaine toxicity, patient should be patch free for 12 hrs daily.Disp-20 patch, [id number]      tiZANidine (ZANAFLEX) 4 MG tablet Take 1 tablet (4 mg) by mouth 3 times daily as needed for muscle spasms (MUSCLE SPASM/Back pain) No driving a car or drinking alcohol for 8 hours after taking this medication., Disp-20 tablet, R-0, E-Prescribe               MD Edith Ken Cheri Lee, MD  06/13/24 [id number]

## 2024-06-13 NOTE — ED TRIAGE NOTES
"Pt arrives with c/o back \"spasms\" x approximately 24 hours. Pt reports foam roller use on 6/10 evening, and the pain didn't begin until the next day. Pt unable to sleep. Massage today offered mild relief. Pain 9/10. Endorses nausea, no emesis.        "

## 2025-01-13 ENCOUNTER — PATIENT OUTREACH (OUTPATIENT)
Dept: OBGYN | Facility: CLINIC | Age: 35
End: 2025-01-13
Payer: COMMERCIAL

## 2025-01-13 PROBLEM — R87.810 ASCUS WITH POSITIVE HIGH RISK HPV CERVICAL: Status: ACTIVE | Noted: 2018-01-23

## 2025-01-13 PROBLEM — R87.610 ASCUS WITH POSITIVE HIGH RISK HPV CERVICAL: Status: ACTIVE | Noted: 2018-01-23

## 2025-03-08 ENCOUNTER — HEALTH MAINTENANCE LETTER (OUTPATIENT)
Age: 35
End: 2025-03-08

## 2025-03-18 NOTE — PROGRESS NOTES
Matthias is a 34 year old  female who presents for annual exam.     Besides routine health maintenance, she has no other health concerns today.  HPI:   Matthias is still living with her fiance and working in [employer] at [place]. She is currently following a vegan diet for lent. She has been busy traveling, recently got back from [place] and will be going back to [place] soon for her brother-in-law's wedding!   Menses are regular q 28-30 days and normal lasting 4 days.   Menses flow: normal.    Patient's last menstrual period was 03/10/2025 (exact date)..   Using none and condoms for contraception.    She is not currently considering pregnancy.    REPRODUCTIVE/GYNECOLOGIC HISTORY:  Matthias is sexually active with male partner(s) and is currently in monogamous relationship.   STI testing offered?  Declined  History of abnormal Pap smear:  Yes  Pap Hx:  18 ASCUS, +HR HPV, not 16/18. Plan colp  18 [place] [facility]: neg. Plan 1 year cotest  2019: Pap: NIL, Neg HR HPV result. Plan cotest in 3 years  3/23/22 ASCUS pap, + HR HPV (not 16/18). Plan 1 year cotest  23 Lost to follow-up for pap tracking  24 NIL pap, neg HR HPV. Cotest in 1 year.    SOCIAL HISTORY  Abuse: does not report having previously been physical or sexually abused.    Do you feel safe in your environment? YES       She  reports that she has never smoked. She has never used smokeless tobacco.      Last PHQ-9 score on record =       2024     3:58 PM   PHQ-9 SCORE   PHQ-9 Total Score 3     Last GAD7 score on record =       2024     3:58 PM   ANTONIETA-7 SCORE   Total Score 0         HEALTH MAINTENANCE:    Care Gaps     Overdue          SEP 1  2024 INFLUENZA VACCINE (1)  Last completed: 2022     SEP 1  2024 COVID-19 Vaccine (3 - 2024-25 season)  Last completed: Sep 2, 2021     HECTOR 1  2025 PHQ-2 (once per calendar year) (Yearly, January to December)  Last completed:  YEARLY PREVENTIVE VISIT (Yearly)  Last  completed:  PAP FOLLOW-UP (Once)  Last completed:  HPV FOLLOW-UP (Once)  Last completed: 2024         Upcoming          2026 ADVANCE CARE PLANNING (Every 5 Years)  Last completed: 2021     AUG 26  2031 DTAP/TDAP/TD IMMUNIZATION (8 - Td or Tdap)  Last completed: Aug 26, 2021     AUG 18  2042 ZOSTER IMMUNIZATION (1 of 2)         HEALTHY HABITS  Eating habits: eats regular meals, follows a balanced nutrition diet, and has adequate calcium intake  Calcium/Vitamin D intake: source: dairy   Exercise: How often do you exercise? 3-5 times/week; pilates, weightlifting, running  Have you had an eye exam in the last two years? NO - will plan on making an appt as she has noticed her vision change  Do you routinely see the dentist once or twice yearly? YES     Mental health: good  Sleep: 5-7 hrs/night, doesn't always feel well rested, sometimes wakes between 2-3 am and is unable to fall back asleep      HISTORY:  OB History    Para Term  AB Living   0 0 0 0 0 0   SAB IAB Ectopic Multiple Live Births   0 0 0 0 0     Past Medical History:   Diagnosis Date    ASCUS with positive high risk HPV cervical 2018    3/2022    Benign headache     1-2 x monthly    Chronic leukopenia     benign; has been evaluated by heme in past (2012/); no intervention indicated; regular monitoring NOT indicated (only check as clinically indicated)     Past Surgical History:   Procedure Laterality Date    NO HISTORY OF SURGERY       Family History   Problem Relation Age of Onset    Heart Disease Maternal Grandfather         details unknown    Diabetes Type 2  Paternal Grandfather     Cerebrovascular Disease Paternal Grandfather         later in life    Heart Surgery Maternal Uncle         details unknown    Myocardial Infarction No family hx of     Coronary Artery Disease Early Onset No family hx of     Colon Cancer No family hx of     Breast Cancer No  "family hx of     Ovarian Cancer No family hx of      Social History     Socioeconomic History    Marital status: Single     Spouse name: Tha   Occupational History    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     No kids.    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        Current Outpatient Medications:     ibuprofen (ADVIL/MOTRIN) 600 MG tablet, Take 1 tablet (600 mg) by mouth every 6 hours as needed for moderate pain (Take with food) (Patient not taking: Reported on 3/20/2025), Disp: 20 tablet, Rfl: 0    lidocaine (LIDODERM) 5 % patch, Place 2 patches onto the skin every 24 hours To prevent lidocaine toxicity, patient should be patch free for 12 hrs daily. (Patient not taking: Reported on 3/20/2025), Disp: 20 patch, Rfl: 0    tiZANidine (ZANAFLEX) 4 MG tablet, Take 1 tablet (4 mg) by mouth 3 times daily as needed for muscle spasms (MUSCLE SPASM/Back pain) No driving a car or drinking alcohol for 8 hours after taking this medication. (Patient not taking: Reported on 3/20/2025), Disp: 20 tablet, Rfl: 0     Allergies   Allergen Reactions    Amoxicillin Hives and Itching       Past medical, surgical, social and family history were reviewed and updated in EPIC.    ROS:   12 point review of systems negative other than symptoms noted below or in the HPI.    PHYSICAL EXAM:  /68   Ht 1.651 m (5' 5\")   Wt 63.7 kg (140 lb 6.4 oz)   LMP 03/10/2025 (Exact Date)   BMI 23.36 kg/m     BMI: Body mass index is 23.36 kg/m .  Constitutional: healthy, alert and no distress  Neck: symmetrical, thyroid normal size, no masses present, no lymphadenopathy present.   Cardiovascular: RRR, no murmurs " present  Respiratory: breathing unlabored, lungs CTA bilaterally  Breast:normal without masses, tenderness or nipple discharge and no palpable axillary masses or adenopathy  Gastrointestinal: abdomen soft, non-tender, bowel sounds present  PELVIC EXAM:  Vulva: No lesions, no adenopathy, BUS WNL  Vagina: Moist, pink, discharge normal well rugated, no lesions  Cervix: Smooth, pink, no visible lesions    ASSESSMENT:    ICD-10-CM    1. Annual physical exam  Z00.00 HPV and Gynecologic Cytology Panel - Recommended Age 30 - 65 Years     Lipid panel reflex to direct LDL Fasting     CBC with platelets     TSH with free T4 reflex     Comprehensive metabolic panel (BMP + Alb, Alk Phos, ALT, AST, Total. Bili, TP)      2. Encounter for screening for cervical cancer  Z12.4 HPV and Gynecologic Cytology Panel - Recommended Age 30 - 65 Years      3. Encounter for lipid screening for cardiovascular disease  Z13.220 Lipid panel reflex to direct LDL Fasting    Z13.6       4. Screening for thyroid disorder  Z13.29 TSH with free T4 reflex      5. Encounter for screening examination for impaired glucose regulation and diabetes mellitus  Z13.1 Comprehensive metabolic panel (BMP + Alb, Alk Phos, ALT, AST, Total. Bili, TP)      6. BMI 23.0-23.9, adult  Z68.23       7. Chronic leukopenia  D72.819       8. ASCUS with positive high risk HPV cervical  R87.610     R87.810         PLAN:  #Annual Preventative Care  - Normal physical exam  - Mood is stable  - Labs: CBC, CMP, TSH, lipids - futured to schedule lab only while fasting  - Pap collected, plan for cotesting in 3 years if NILM/neg  - HCV/HIV screening done previously  - BMI 23.36    COUNSELING:   Reviewed preventive health counseling, as reflected in patient instructions       Regular exercise       Healthy diet/nutrition       Vision screening       Safe sex practices/STD prevention    Belia Almaguer, Student Nurse Midwife     I was present with the student who participated in the service  and in the documentation of the note. I have verified the history and personally performed the physical exam and medical decision-making. I agree with the assessment and plan of care as documented in the note.    30 minutes spent by me on the date of the encounter doing chart review, history and exam, documentation and further activities per the note,this time does not include student or teaching time.     Ramona Carroll, ARIANNE, WANDERM

## 2025-03-20 ENCOUNTER — OFFICE VISIT (OUTPATIENT)
Dept: MIDWIFE SERVICES | Facility: CLINIC | Age: 35
End: 2025-03-20
Payer: COMMERCIAL

## 2025-03-20 VITALS
SYSTOLIC BLOOD PRESSURE: 124 MMHG | BODY MASS INDEX: 23.39 KG/M2 | DIASTOLIC BLOOD PRESSURE: 68 MMHG | WEIGHT: 140.4 LBS | HEIGHT: 65 IN

## 2025-03-20 DIAGNOSIS — D72.819 CHRONIC LEUKOPENIA: ICD-10-CM

## 2025-03-20 DIAGNOSIS — Z13.1 ENCOUNTER FOR SCREENING EXAMINATION FOR IMPAIRED GLUCOSE REGULATION AND DIABETES MELLITUS: ICD-10-CM

## 2025-03-20 DIAGNOSIS — Z12.4 ENCOUNTER FOR SCREENING FOR CERVICAL CANCER: ICD-10-CM

## 2025-03-20 DIAGNOSIS — Z13.6 ENCOUNTER FOR LIPID SCREENING FOR CARDIOVASCULAR DISEASE: ICD-10-CM

## 2025-03-20 DIAGNOSIS — R87.610 ASCUS WITH POSITIVE HIGH RISK HPV CERVICAL: ICD-10-CM

## 2025-03-20 DIAGNOSIS — Z13.29 SCREENING FOR THYROID DISORDER: ICD-10-CM

## 2025-03-20 DIAGNOSIS — Z00.00 ANNUAL PHYSICAL EXAM: Primary | ICD-10-CM

## 2025-03-20 DIAGNOSIS — R87.810 ASCUS WITH POSITIVE HIGH RISK HPV CERVICAL: ICD-10-CM

## 2025-03-20 DIAGNOSIS — Z13.220 ENCOUNTER FOR LIPID SCREENING FOR CARDIOVASCULAR DISEASE: ICD-10-CM

## 2025-03-24 LAB
HPV HR 12 DNA CVX QL NAA+PROBE: NEGATIVE
HPV16 DNA CVX QL NAA+PROBE: NEGATIVE
HPV18 DNA CVX QL NAA+PROBE: NEGATIVE
HUMAN PAPILLOMA VIRUS FINAL DIAGNOSIS: NORMAL

## 2025-03-26 LAB
BKR AP ASSOCIATED HPV REPORT: NORMAL
BKR LAB AP GYN ADEQUACY: NORMAL
BKR LAB AP GYN INTERPRETATION: NORMAL
BKR LAB AP PREVIOUS ABNL DX: NORMAL
BKR LAB AP PREVIOUS ABNORMAL: NORMAL
PATH REPORT.COMMENTS IMP SPEC: NORMAL
PATH REPORT.COMMENTS IMP SPEC: NORMAL
PATH REPORT.RELEVANT HX SPEC: NORMAL

## 2025-04-12 ENCOUNTER — LAB (OUTPATIENT)
Dept: LAB | Facility: CLINIC | Age: 35
End: 2025-04-12
Payer: COMMERCIAL

## 2025-04-12 DIAGNOSIS — Z13.6 ENCOUNTER FOR LIPID SCREENING FOR CARDIOVASCULAR DISEASE: ICD-10-CM

## 2025-04-12 DIAGNOSIS — Z13.29 SCREENING FOR THYROID DISORDER: ICD-10-CM

## 2025-04-12 DIAGNOSIS — Z00.00 ANNUAL PHYSICAL EXAM: ICD-10-CM

## 2025-04-12 DIAGNOSIS — Z13.1 ENCOUNTER FOR SCREENING EXAMINATION FOR IMPAIRED GLUCOSE REGULATION AND DIABETES MELLITUS: ICD-10-CM

## 2025-04-12 DIAGNOSIS — Z13.220 ENCOUNTER FOR LIPID SCREENING FOR CARDIOVASCULAR DISEASE: ICD-10-CM

## 2025-04-12 LAB
ALBUMIN SERPL BCG-MCNC: 4.7 G/DL (ref 3.5–5.2)
ALP SERPL-CCNC: 55 U/L (ref 40–150)
ALT SERPL W P-5'-P-CCNC: 15 U/L (ref 0–50)
ANION GAP SERPL CALCULATED.3IONS-SCNC: 11 MMOL/L (ref 7–15)
AST SERPL W P-5'-P-CCNC: 18 U/L (ref 0–45)
BILIRUB SERPL-MCNC: 0.7 MG/DL
BUN SERPL-MCNC: 6.1 MG/DL (ref 6–20)
CALCIUM SERPL-MCNC: 9.7 MG/DL (ref 8.8–10.4)
CHLORIDE SERPL-SCNC: 103 MMOL/L (ref 98–107)
CHOLEST SERPL-MCNC: 201 MG/DL
CREAT SERPL-MCNC: 0.7 MG/DL (ref 0.51–0.95)
EGFRCR SERPLBLD CKD-EPI 2021: >90 ML/MIN/1.73M2
ERYTHROCYTE [DISTWIDTH] IN BLOOD BY AUTOMATED COUNT: 11.9 % (ref 10–15)
FASTING STATUS PATIENT QL REPORTED: YES
FASTING STATUS PATIENT QL REPORTED: YES
GLUCOSE SERPL-MCNC: 88 MG/DL (ref 70–99)
HCO3 SERPL-SCNC: 23 MMOL/L (ref 22–29)
HCT VFR BLD AUTO: 40.6 % (ref 35–47)
HDLC SERPL-MCNC: 42 MG/DL
HGB BLD-MCNC: 13.9 G/DL (ref 11.7–15.7)
LDLC SERPL CALC-MCNC: 142 MG/DL
MCH RBC QN AUTO: 28.5 PG (ref 26.5–33)
MCHC RBC AUTO-ENTMCNC: 34.2 G/DL (ref 31.5–36.5)
MCV RBC AUTO: 83 FL (ref 78–100)
NONHDLC SERPL-MCNC: 159 MG/DL
PLATELET # BLD AUTO: 178 10E3/UL (ref 150–450)
POTASSIUM SERPL-SCNC: 3.9 MMOL/L (ref 3.4–5.3)
PROT SERPL-MCNC: 7.9 G/DL (ref 6.4–8.3)
RBC # BLD AUTO: 4.88 10E6/UL (ref 3.8–5.2)
SODIUM SERPL-SCNC: 137 MMOL/L (ref 135–145)
TRIGL SERPL-MCNC: 84 MG/DL
TSH SERPL DL<=0.005 MIU/L-ACNC: 2.46 UIU/ML (ref 0.3–4.2)
WBC # BLD AUTO: 2.6 10E3/UL (ref 4–11)

## 2025-04-12 PROCEDURE — 80061 LIPID PANEL: CPT | Performed by: PATHOLOGY

## 2025-04-12 PROCEDURE — 85027 COMPLETE CBC AUTOMATED: CPT | Performed by: PATHOLOGY

## 2025-04-12 PROCEDURE — 80053 COMPREHEN METABOLIC PANEL: CPT | Performed by: PATHOLOGY

## 2025-04-12 PROCEDURE — 84443 ASSAY THYROID STIM HORMONE: CPT | Performed by: PATHOLOGY

## 2025-04-12 PROCEDURE — 36415 COLL VENOUS BLD VENIPUNCTURE: CPT | Performed by: PATHOLOGY

## 2025-08-06 ENCOUNTER — TELEPHONE (OUTPATIENT)
Dept: MIDWIFE SERVICES | Facility: CLINIC | Age: 35
End: 2025-08-06
Payer: COMMERCIAL

## 2025-08-06 DIAGNOSIS — N92.6 IRREGULAR BLEEDING: Primary | ICD-10-CM
